# Patient Record
Sex: FEMALE | Race: WHITE | NOT HISPANIC OR LATINO | URBAN - METROPOLITAN AREA
[De-identification: names, ages, dates, MRNs, and addresses within clinical notes are randomized per-mention and may not be internally consistent; named-entity substitution may affect disease eponyms.]

---

## 2017-11-27 ENCOUNTER — OUTPATIENT (OUTPATIENT)
Dept: OUTPATIENT SERVICES | Facility: HOSPITAL | Age: 52
LOS: 1 days | Discharge: HOME | End: 2017-11-27

## 2017-11-27 DIAGNOSIS — C50.912 MALIGNANT NEOPLASM OF UNSPECIFIED SITE OF LEFT FEMALE BREAST: ICD-10-CM

## 2017-12-01 ENCOUNTER — RESULT REVIEW (OUTPATIENT)
Age: 52
End: 2017-12-01

## 2017-12-01 ENCOUNTER — APPOINTMENT (OUTPATIENT)
Dept: HEMATOLOGY ONCOLOGY | Facility: CLINIC | Age: 52
End: 2017-12-01
Payer: COMMERCIAL

## 2017-12-01 VITALS
SYSTOLIC BLOOD PRESSURE: 120 MMHG | RESPIRATION RATE: 16 BRPM | WEIGHT: 166.01 LBS | HEIGHT: 62 IN | DIASTOLIC BLOOD PRESSURE: 78 MMHG | OXYGEN SATURATION: 99 % | TEMPERATURE: 98.8 F | HEART RATE: 75 BPM | BODY MASS INDEX: 30.55 KG/M2

## 2017-12-01 DIAGNOSIS — Z13.71 ENCOUNTER FOR NONPROCREATIVE SCREENING FOR GENETIC DISEASE CARRIER STATUS: ICD-10-CM

## 2017-12-01 LAB
BASOPHILS # BLD AUTO: 0.1 K/UL — SIGNIFICANT CHANGE UP (ref 0–0.2)
BASOPHILS NFR BLD AUTO: 1.4 % — SIGNIFICANT CHANGE UP (ref 0–2)
EOSINOPHIL # BLD AUTO: 0.1 K/UL — SIGNIFICANT CHANGE UP (ref 0–0.5)
EOSINOPHIL NFR BLD AUTO: 1.8 % — SIGNIFICANT CHANGE UP (ref 0–6)
HCT VFR BLD CALC: 39.5 % — SIGNIFICANT CHANGE UP (ref 34.5–45)
HGB BLD-MCNC: 13.2 G/DL — SIGNIFICANT CHANGE UP (ref 11.5–15.5)
LYMPHOCYTES # BLD AUTO: 1.9 K/UL — SIGNIFICANT CHANGE UP (ref 1–3.3)
LYMPHOCYTES # BLD AUTO: 28.2 % — SIGNIFICANT CHANGE UP (ref 13–44)
MCHC RBC-ENTMCNC: 29.8 PG — SIGNIFICANT CHANGE UP (ref 27–34)
MCHC RBC-ENTMCNC: 33.5 G/DL — SIGNIFICANT CHANGE UP (ref 32–36)
MCV RBC AUTO: 89 FL — SIGNIFICANT CHANGE UP (ref 80–100)
MONOCYTES # BLD AUTO: 0.5 K/UL — SIGNIFICANT CHANGE UP (ref 0–0.9)
MONOCYTES NFR BLD AUTO: 7.3 % — SIGNIFICANT CHANGE UP (ref 2–14)
NEUTROPHILS # BLD AUTO: 4.1 K/UL — SIGNIFICANT CHANGE UP (ref 1.8–7.4)
NEUTROPHILS NFR BLD AUTO: 61.3 % — SIGNIFICANT CHANGE UP (ref 43–77)
PLATELET # BLD AUTO: 228 K/UL — SIGNIFICANT CHANGE UP (ref 150–400)
RBC # BLD: 4.44 M/UL — SIGNIFICANT CHANGE UP (ref 3.8–5.2)
RBC # FLD: 11 % — SIGNIFICANT CHANGE UP (ref 10.3–14.5)
WBC # BLD: 6.7 K/UL — SIGNIFICANT CHANGE UP (ref 3.8–10.5)
WBC # FLD AUTO: 6.7 K/UL — SIGNIFICANT CHANGE UP (ref 3.8–10.5)

## 2017-12-01 PROCEDURE — 99215 OFFICE O/P EST HI 40 MIN: CPT

## 2017-12-01 RX ORDER — SODIUM PICOSULFATE, MAGNESIUM OXIDE, AND ANHYDROUS CITRIC ACID 10; 3.5; 12 MG/16.2G; G/16.2G; G/16.2G
10-3.5-12 POWDER, METERED ORAL
Qty: 2 | Refills: 0 | Status: DISCONTINUED | COMMUNITY
Start: 2017-07-25

## 2017-12-05 LAB
25(OH)D3 SERPL-MCNC: 33 NG/ML
ALBUMIN SERPL ELPH-MCNC: 4.5 G/DL
ALP BLD-CCNC: 70 U/L
ALT SERPL-CCNC: 42 U/L
ANION GAP SERPL CALC-SCNC: 12 MMOL/L
AST SERPL-CCNC: 28 U/L
BILIRUB SERPL-MCNC: <0.2 MG/DL
BUN SERPL-MCNC: 18 MG/DL
CALCIUM SERPL-MCNC: 9.6 MG/DL
CHLORIDE SERPL-SCNC: 103 MMOL/L
CO2 SERPL-SCNC: 26 MMOL/L
CREAT SERPL-MCNC: 0.75 MG/DL
GLUCOSE SERPL-MCNC: 98 MG/DL
POTASSIUM SERPL-SCNC: 4.2 MMOL/L
PROT SERPL-MCNC: 7.3 G/DL
SODIUM SERPL-SCNC: 141 MMOL/L

## 2018-12-17 ENCOUNTER — OUTPATIENT (OUTPATIENT)
Dept: OUTPATIENT SERVICES | Facility: HOSPITAL | Age: 53
LOS: 1 days | Discharge: HOME | End: 2018-12-17

## 2018-12-17 DIAGNOSIS — C50.912 MALIGNANT NEOPLASM OF UNSPECIFIED SITE OF LEFT FEMALE BREAST: ICD-10-CM

## 2018-12-28 ENCOUNTER — RESULT REVIEW (OUTPATIENT)
Age: 53
End: 2018-12-28

## 2018-12-28 ENCOUNTER — APPOINTMENT (OUTPATIENT)
Dept: HEMATOLOGY ONCOLOGY | Facility: CLINIC | Age: 53
End: 2018-12-28
Payer: COMMERCIAL

## 2018-12-28 ENCOUNTER — TRANSCRIPTION ENCOUNTER (OUTPATIENT)
Age: 53
End: 2018-12-28

## 2018-12-28 VITALS
HEART RATE: 92 BPM | OXYGEN SATURATION: 98 % | DIASTOLIC BLOOD PRESSURE: 73 MMHG | WEIGHT: 165.1 LBS | SYSTOLIC BLOOD PRESSURE: 106 MMHG | TEMPERATURE: 98.4 F | RESPIRATION RATE: 16 BRPM | BODY MASS INDEX: 30.2 KG/M2

## 2018-12-28 DIAGNOSIS — G89.18 OTHER ACUTE POSTPROCEDURAL PAIN: ICD-10-CM

## 2018-12-28 DIAGNOSIS — M79.18 MYALGIA, OTHER SITE: ICD-10-CM

## 2018-12-28 LAB
BASOPHILS # BLD AUTO: 0.1 K/UL — SIGNIFICANT CHANGE UP (ref 0–0.2)
BASOPHILS NFR BLD AUTO: 1.6 % — SIGNIFICANT CHANGE UP (ref 0–2)
EOSINOPHIL # BLD AUTO: 0.1 K/UL — SIGNIFICANT CHANGE UP (ref 0–0.5)
EOSINOPHIL NFR BLD AUTO: 2.8 % — SIGNIFICANT CHANGE UP (ref 0–6)
HCT VFR BLD CALC: 39.2 % — SIGNIFICANT CHANGE UP (ref 34.5–45)
HGB BLD-MCNC: 13.9 G/DL — SIGNIFICANT CHANGE UP (ref 11.5–15.5)
LYMPHOCYTES # BLD AUTO: 1.3 K/UL — SIGNIFICANT CHANGE UP (ref 1–3.3)
LYMPHOCYTES # BLD AUTO: 32.8 % — SIGNIFICANT CHANGE UP (ref 13–44)
MCHC RBC-ENTMCNC: 31.3 PG — SIGNIFICANT CHANGE UP (ref 27–34)
MCHC RBC-ENTMCNC: 35.5 G/DL — SIGNIFICANT CHANGE UP (ref 32–36)
MCV RBC AUTO: 88 FL — SIGNIFICANT CHANGE UP (ref 80–100)
MONOCYTES # BLD AUTO: 0.4 K/UL — SIGNIFICANT CHANGE UP (ref 0–0.9)
MONOCYTES NFR BLD AUTO: 9.6 % — SIGNIFICANT CHANGE UP (ref 2–14)
NEUTROPHILS # BLD AUTO: 2.2 K/UL — SIGNIFICANT CHANGE UP (ref 1.8–7.4)
NEUTROPHILS NFR BLD AUTO: 53.2 % — SIGNIFICANT CHANGE UP (ref 43–77)
PLATELET # BLD AUTO: 250 K/UL — SIGNIFICANT CHANGE UP (ref 150–400)
RBC # BLD: 4.45 M/UL — SIGNIFICANT CHANGE UP (ref 3.8–5.2)
RBC # FLD: 11.4 % — SIGNIFICANT CHANGE UP (ref 10.3–14.5)
WBC # BLD: 4.1 K/UL — SIGNIFICANT CHANGE UP (ref 3.8–10.5)
WBC # FLD AUTO: 4.1 K/UL — SIGNIFICANT CHANGE UP (ref 3.8–10.5)

## 2018-12-28 PROCEDURE — 99214 OFFICE O/P EST MOD 30 MIN: CPT

## 2018-12-28 RX ORDER — THYROID, PORCINE 60 MG/1
60 TABLET ORAL
Qty: 30 | Refills: 0 | Status: DISCONTINUED | COMMUNITY
Start: 2017-05-17 | End: 2018-12-28

## 2018-12-28 NOTE — ASSESSMENT
[FreeTextEntry1] : stage IIIa ER positive, HER-2/benny positive left breast cancer in 2006, status post neoadjuvant ACTH followed by bilateral mastectomy with implants in May 2007, radiation therapy, BSO in 2008, exemestane from July 2007 to February 2011. Exemestane was stopped in February 2011 due to pulmonary embolism and abnormal LFTs. Anastrozole was started in April 2013, which she took inconsistently for a year and a half and eventually stopped in 2015 due to significant arthralgias.\par \par - Breast ca: She is doing well. Clinically TARSHA. No sequela from chemotherapy or surgery. Encourage healthy diet and exercise. Routine labs today\par - Post mastectomy pain: s/p injections for myofascial syndrome. Pain improved. Rec to f/u with plastics as they are almost 9 yrs old.  \par - Low Vit D: Levels improved with suppls. Continue calcium and vitamin D for bone health\par - Continue followup with primary care for age-appropriate malignancy screening\par - Educated about s/sx of recurrence\par - RTC  1y

## 2018-12-28 NOTE — HISTORY OF PRESENT ILLNESS
[Treatment Protocol] : Treatment Protocol [de-identified] : 53-year-old lady with history of stage IIIa left breast cancer, ER positive and HER-2/benny positive diagnosed in 2006.\par \par She initially presented at age 40 years when she felt a left breast lump and noted to have matted lymph nodes on exam. Core biopsy revealed adenocarcinoma, ER positive, [place] positive and HER-2/benny positive. She received neoadjuvant chemotherapy with AC followed by TH from December 2006 to May 2007. She underwent bilateral mastectomy implants in May 2007. JCB adjuvant left chest wall radiation from July 2007 to August 2007. She received ovarian suppression (SOFT trial) plus exemestane from July 2007. She underwent bilateral salpingo-oophorectomy in 2008 she continued exemestane from July 2007 to February 2011. Patient developed pulmonary embolism in Feb 2011.. She was treated with Lovenox for 6 months. Exemestane was stopped at that time 2 new pulmonary embolism and abnormal LFTs. She was restarted on anastrozole in April 2013 but stopped in Aug 2015 due to intolerable side effects. SHe didn't want to consider prolonged AI use.  [FreeTextEntry1] : ACTH 12/2006 - 5/2007.\par Lupron + Exemestane 7/2007- 7/2008\par Exemestane completed 2/2011. [de-identified] : Ms. FAIZAN MORTON is here for f/u of left breast cancer completed endocrine therapy 2/2011.\par No residual s/e from chemo or surgery. No lymphedema. No neuropathy, no cardiac S/E. No bone pain, ROS negative\par She has post mastectomy pain, s/p injection by DR Downs 2 yrs ago and felt better. Implants last replaced 2009. Dr Balbuena\par Working full time (science aid in a school), energy good, no change in appetite, no wt loss\par LMP 2006 (BSO)\par Thyroid issues controlled with meds

## 2018-12-28 NOTE — PHYSICAL EXAM
[Fully active, able to carry on all pre-disease performance without restriction] : Status 0 - Fully active, able to carry on all pre-disease performance without restriction [Normal] : affect appropriate [de-identified] : bilateral mastectomy with implants, no chest wall or axillary nodules

## 2018-12-31 LAB
25(OH)D3 SERPL-MCNC: 35.1 NG/ML
ALBUMIN SERPL ELPH-MCNC: 4.4 G/DL
ALP BLD-CCNC: 59 U/L
ALT SERPL-CCNC: 39 U/L
ANION GAP SERPL CALC-SCNC: 11 MMOL/L
AST SERPL-CCNC: 30 U/L
BILIRUB SERPL-MCNC: 0.2 MG/DL
BUN SERPL-MCNC: 15 MG/DL
CALCIUM SERPL-MCNC: 9.9 MG/DL
CHLORIDE SERPL-SCNC: 103 MMOL/L
CO2 SERPL-SCNC: 26 MMOL/L
CREAT SERPL-MCNC: 0.7 MG/DL
GLUCOSE SERPL-MCNC: 103 MG/DL
POTASSIUM SERPL-SCNC: 4.5 MMOL/L
PROT SERPL-MCNC: 6.9 G/DL
SODIUM SERPL-SCNC: 140 MMOL/L

## 2019-04-01 ENCOUNTER — TRANSCRIPTION ENCOUNTER (OUTPATIENT)
Age: 54
End: 2019-04-01

## 2019-04-23 ENCOUNTER — RECORD ABSTRACTING (OUTPATIENT)
Age: 54
End: 2019-04-23

## 2019-04-23 DIAGNOSIS — I26.99 OTHER PULMONARY EMBOLISM W/OUT ACUTE COR PULMONALE: ICD-10-CM

## 2019-04-23 DIAGNOSIS — R10.32 LEFT LOWER QUADRANT PAIN: ICD-10-CM

## 2019-04-23 DIAGNOSIS — H93.8X9 OTHER SPECIFIED DISORDERS OF EAR, UNSPECIFIED EAR: ICD-10-CM

## 2019-04-23 DIAGNOSIS — R42 DIZZINESS AND GIDDINESS: ICD-10-CM

## 2019-04-23 DIAGNOSIS — Z87.09 PERSONAL HISTORY OF OTHER DISEASES OF THE RESPIRATORY SYSTEM: ICD-10-CM

## 2019-04-23 DIAGNOSIS — K21.9 GASTRO-ESOPHAGEAL REFLUX DISEASE W/OUT ESOPHAGITIS: ICD-10-CM

## 2019-04-23 DIAGNOSIS — Z12.11 ENCOUNTER FOR SCREENING FOR MALIGNANT NEOPLASM OF COLON: ICD-10-CM

## 2019-04-24 ENCOUNTER — APPOINTMENT (OUTPATIENT)
Dept: INTERNAL MEDICINE | Facility: CLINIC | Age: 54
End: 2019-04-24

## 2019-08-05 PROBLEM — R10.32 LEFT LOWER QUADRANT PAIN: Status: ACTIVE | Noted: 2019-04-23

## 2019-08-06 ENCOUNTER — NON-APPOINTMENT (OUTPATIENT)
Age: 54
End: 2019-08-06

## 2019-08-06 ENCOUNTER — APPOINTMENT (OUTPATIENT)
Dept: INTERNAL MEDICINE | Facility: CLINIC | Age: 54
End: 2019-08-06
Payer: COMMERCIAL

## 2019-08-06 VITALS
HEIGHT: 62 IN | WEIGHT: 163 LBS | HEART RATE: 82 BPM | SYSTOLIC BLOOD PRESSURE: 111 MMHG | DIASTOLIC BLOOD PRESSURE: 77 MMHG | BODY MASS INDEX: 30 KG/M2

## 2019-08-06 LAB
BASOPHILS # BLD AUTO: 0.03 K/UL
BASOPHILS NFR BLD AUTO: 0.8 %
BILIRUB UR QL STRIP: NORMAL
CLARITY UR: CLEAR
COLLECTION METHOD: NORMAL
EOSINOPHIL # BLD AUTO: 0.13 K/UL
EOSINOPHIL NFR BLD AUTO: 3.3 %
ESTIMATED AVERAGE GLUCOSE: 117 MG/DL
GLUCOSE UR-MCNC: NORMAL
HBA1C MFR BLD HPLC: 5.7 %
HCG UR QL: 0.2 EU/DL
HCT VFR BLD CALC: 41.3 %
HGB BLD-MCNC: 13.5 G/DL
HGB UR QL STRIP.AUTO: NORMAL
IMM GRANULOCYTES NFR BLD AUTO: 0.3 %
KETONES UR-MCNC: NORMAL
LEUKOCYTE ESTERASE UR QL STRIP: NORMAL
LYMPHOCYTES # BLD AUTO: 1.39 K/UL
LYMPHOCYTES NFR BLD AUTO: 34.8 %
MAN DIFF?: NORMAL
MCHC RBC-ENTMCNC: 29.4 PG
MCHC RBC-ENTMCNC: 32.7 GM/DL
MCV RBC AUTO: 90 FL
MONOCYTES # BLD AUTO: 0.4 K/UL
MONOCYTES NFR BLD AUTO: 10 %
NEUTROPHILS # BLD AUTO: 2.03 K/UL
NEUTROPHILS NFR BLD AUTO: 50.8 %
NITRITE UR QL STRIP: NORMAL
PH UR STRIP: 6
PLATELET # BLD AUTO: 255 K/UL
PROT UR STRIP-MCNC: NORMAL
RBC # BLD: 4.59 M/UL
RBC # FLD: 11.9 %
SP GR UR STRIP: 1.02
WBC # FLD AUTO: 3.99 K/UL

## 2019-08-06 PROCEDURE — 36415 COLL VENOUS BLD VENIPUNCTURE: CPT

## 2019-08-06 PROCEDURE — 99396 PREV VISIT EST AGE 40-64: CPT | Mod: 25

## 2019-08-06 PROCEDURE — 93000 ELECTROCARDIOGRAM COMPLETE: CPT

## 2019-08-06 PROCEDURE — 81003 URINALYSIS AUTO W/O SCOPE: CPT | Mod: QW

## 2019-08-06 PROCEDURE — G0444 DEPRESSION SCREEN ANNUAL: CPT

## 2019-08-06 RX ORDER — SODIUM PICOSULFATE, MAGNESIUM OXIDE, AND ANHYDROUS CITRIC ACID 10; 3.5; 12 MG/16.2G; G/16.2G; G/16.2G
10-3.5-12 POWDER, METERED ORAL
Refills: 0 | Status: DISCONTINUED | COMMUNITY
End: 2019-08-06

## 2019-08-06 RX ORDER — CALCIUM CARBONATE/VITAMIN D3 600MG-5MCG
600-200 TABLET ORAL
Refills: 0 | Status: DISCONTINUED | COMMUNITY
End: 2019-08-06

## 2019-08-06 RX ORDER — LEVOTHYROXINE SODIUM 0.05 MG/1
50 TABLET ORAL DAILY
Qty: 90 | Refills: 3 | Status: DISCONTINUED | COMMUNITY
End: 2019-08-06

## 2019-08-06 NOTE — HISTORY OF PRESENT ILLNESS
[FreeTextEntry1] : on thyroid rx  has some fatigue  she exercises and she says she is watching carbs

## 2019-08-06 NOTE — HEALTH RISK ASSESSMENT
[Good] : ~his/her~  mood as  good [No] : No [No falls in past year] : Patient reported no falls in the past year [0] : 2) Feeling down, depressed, or hopeless: Not at all (0) [Patient reported PAP Smear was normal] : Patient reported PAP Smear was normal [Patient reported colonoscopy was normal] : Patient reported colonoscopy was normal [None] : None [Fully functional (using the telephone, shopping, preparing meals, housekeeping, doing laundry, using] : Fully functional and needs no help or supervision to perform IADLs (using the telephone, shopping, preparing meals, housekeeping, doing laundry, using transportation, managing medications and managing finances) [Fully functional (bathing, dressing, toileting, transferring, walking, feeding)] : Fully functional (bathing, dressing, toileting, transferring, walking, feeding) [] : No [de-identified] : exercises [de-identified] : good [Reports changes in hearing] : Reports no changes in hearing [Reports changes in vision] : Reports no changes in vision [MammogramDate] : mastectomies [Reports changes in dental health] : Reports no changes in dental health [ColonoscopyDate] : 2017 [PapSmearDate] : 2019

## 2019-08-07 LAB
ALBUMIN SERPL ELPH-MCNC: 4.5 G/DL
ALP BLD-CCNC: 56 U/L
ALT SERPL-CCNC: 32 U/L
ANION GAP SERPL CALC-SCNC: 13 MMOL/L
AST SERPL-CCNC: 25 U/L
BILIRUB SERPL-MCNC: 0.4 MG/DL
BUN SERPL-MCNC: 14 MG/DL
CALCIUM SERPL-MCNC: 9.8 MG/DL
CHLORIDE SERPL-SCNC: 103 MMOL/L
CHOLEST SERPL-MCNC: 183 MG/DL
CHOLEST/HDLC SERPL: 3.5 RATIO
CO2 SERPL-SCNC: 23 MMOL/L
CREAT SERPL-MCNC: 0.78 MG/DL
GLUCOSE SERPL-MCNC: 100 MG/DL
HDLC SERPL-MCNC: 53 MG/DL
LDLC SERPL CALC-MCNC: 113 MG/DL
POTASSIUM SERPL-SCNC: 4.4 MMOL/L
PROT SERPL-MCNC: 6.8 G/DL
SODIUM SERPL-SCNC: 139 MMOL/L
TRIGL SERPL-MCNC: 85 MG/DL
TSH SERPL-ACNC: 0.31 UIU/ML

## 2019-08-12 ENCOUNTER — APPOINTMENT (OUTPATIENT)
Dept: ENDOCRINOLOGY | Facility: CLINIC | Age: 54
End: 2019-08-12
Payer: COMMERCIAL

## 2019-08-12 VITALS
SYSTOLIC BLOOD PRESSURE: 110 MMHG | BODY MASS INDEX: 30.36 KG/M2 | RESPIRATION RATE: 16 BRPM | DIASTOLIC BLOOD PRESSURE: 70 MMHG | WEIGHT: 165 LBS | HEART RATE: 79 BPM | OXYGEN SATURATION: 97 % | HEIGHT: 62 IN

## 2019-08-12 PROCEDURE — 99204 OFFICE O/P NEW MOD 45 MIN: CPT

## 2019-08-12 RX ORDER — OMEGA-3/DHA/EPA/FISH OIL 1200 MG
1200 CAPSULE ORAL
Refills: 0 | Status: DISCONTINUED | COMMUNITY
End: 2019-08-12

## 2019-08-12 NOTE — CONSULT LETTER
[Dear  ___] : Dear  [unfilled], [Sincerely,] : Sincerely, [FreeTextEntry1] : Thank you for referring  Ms. FAIZAN MORTON to me for evaluation and treatment. Please, see attached consultation note. As always, if there are specific questions you would like to discuss, please feel free to contact me.\par Thank you for the courtesy of this evaluation.\par  [FreeTextEntry3] : Abigail cOonnor MD, FACE, ECNU\par

## 2019-08-12 NOTE — ASSESSMENT
[Levothyroxine] : The patient was instructed to take Levothyroxine on an empty stomach, separate from vitamins, and wait at least 30 minutes before eating [FreeTextEntry1] : - discussed with the patient current approaches to Hashimoto's thyroiditis\par - options to lower autoimmunity reviewed\par - advised on R+B of a combination T3/T4 therapy\par - diets reviewed\par - switch to Tirosint 175 mcg qd\par - Proper intake of levothyroxine is reviewed in details, including on an empty stomach, with water only, at least one hour before taking any medications, vitamins, or supplements and three-four hours before taking iron or calcium.\par RTC 2 months, labs prior\par

## 2019-08-12 NOTE — HISTORY OF PRESENT ILLNESS
[FreeTextEntry1] : 53 year female  referred for hypothyroidism management. \par Ms. MORTON  was diagnosed with Hashimoto's hypothyroidism in 2015. Previously under care of Dr. Menjivar.\par Initially on synthroid 112mcg, eventually switched to [place] thyroid because she was not feeling well. She's presently on [place] 120 mg, but states that she feels the same. Still complains of fatigue, difficulties losing weight, hair thinning\par Denies family history of thyroid cancer or history of radiation exposure to head and neck area in a childhood.\par TSH- 0.31 <-- 4.92 <-- 2.71, \par FT4 DD- 0.65\par prior T4- 4.8, T3- 113\par She recalls a normal thyroid US done several years ago\par Had DXA last week- results are still pending

## 2019-08-13 LAB — T4 FREE SERPL DIALY-MCNC: 0.65 NG/DL

## 2019-09-09 ENCOUNTER — APPOINTMENT (OUTPATIENT)
Dept: PLASTIC SURGERY | Facility: CLINIC | Age: 54
End: 2019-09-09
Payer: COMMERCIAL

## 2019-09-09 VITALS — HEIGHT: 62 IN | BODY MASS INDEX: 30.36 KG/M2 | WEIGHT: 165 LBS

## 2019-09-09 PROCEDURE — 99205 OFFICE O/P NEW HI 60 MIN: CPT

## 2019-09-09 NOTE — REASON FOR VISIT
[Consultation] : a consultation visit [Spouse] : spouse [FreeTextEntry1] : patient presents for breast reconstruction consultation

## 2019-09-09 NOTE — HISTORY OF PRESENT ILLNESS
[FreeTextEntry1] : 52 yo female presents to discuss breast reconstruction revision.  The patient was diagnosed with left breast cancer in .  She underwent bilateral skin sparing mastectomies with Dr. Oropeza and implant reconstruction with saline implants.  The implants were placed subpectoral, patient is unsure of size or type.  The patient then underwent chemotherapy and radiation.  In  the implants were replaced with silicone implants.  The patient had bilateral NAC reconstruction in .  In  the patient had the left implant exchanged following capsular contracture.  She complains of animation deformity and ptosis of the left breast implant. The patient has discomfort at both breast reconstruction sites, left >> right.  This is exacerbated with activity. \par \par The patient has hypothyroidism for which she takes Tirosint.  She has history of PE in  while taking exemestane.  She has additional surgical history of laparoscopic oophorectomy and laparoscopic cholecystectomy.  She has two children, both .  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

## 2019-09-09 NOTE — PHYSICAL EXAM
[NI] : Normal [de-identified] : evidence of bilateral nipple and areola reconstruction, sternal notch to reconstructed nipple on the left is 19 cm and 19 cm on the right, the nipple to IMF is 10 cm on the left and 8 cm on the right, the base width is 11 cm, there is grade 1 ptosis.  The right IMF is higher than the left IMF.  There are bilateral T pattern scars and right inferior pole scar contracture (evidence of past mastectomy skin necrosis). There is animation deformity and significant breast asymmetry.  There is inferior left breast reconstruction implant malposition (with recruitment of abdominal skin).  there is no open wound or palpable mass of either breast reconstruction site.  [de-identified] : there is adequate adiposity for free flap tissue transfer, but with smaller volume than current breast volume

## 2019-10-09 ENCOUNTER — LABORATORY RESULT (OUTPATIENT)
Age: 54
End: 2019-10-09

## 2019-10-11 ENCOUNTER — TRANSCRIPTION ENCOUNTER (OUTPATIENT)
Age: 54
End: 2019-10-11

## 2019-10-15 ENCOUNTER — APPOINTMENT (OUTPATIENT)
Dept: ENDOCRINOLOGY | Facility: CLINIC | Age: 54
End: 2019-10-15
Payer: COMMERCIAL

## 2019-10-15 VITALS
TEMPERATURE: 98.2 F | OXYGEN SATURATION: 96 % | HEIGHT: 62 IN | DIASTOLIC BLOOD PRESSURE: 76 MMHG | WEIGHT: 165 LBS | SYSTOLIC BLOOD PRESSURE: 110 MMHG | BODY MASS INDEX: 30.36 KG/M2 | HEART RATE: 71 BPM

## 2019-10-15 PROCEDURE — 99214 OFFICE O/P EST MOD 30 MIN: CPT

## 2019-10-15 NOTE — HISTORY OF PRESENT ILLNESS
[FreeTextEntry1] : 53 year female  f/u  for hypothyroidism management. \par feels well on Tirosint overall, but with occas palpitations past month\par still on 175 mcg qd\par Hair loss improved a lot.\par TSH- 0.01, FT4- 1.9\par neg celiac panel\par \par HPI:\par Ms. MORTON  was diagnosed with Hashimoto's hypothyroidism in 2015. Previously under care of Dr. Menjivar.\par Initially on synthroid 112mcg, eventually switched to [place] thyroid because she was not feeling well. She's presently on [place] 120 mg, but states that she feels the same. Still complains of fatigue, difficulties losing weight, hair thinning\par Denies family history of thyroid cancer or history of radiation exposure to head and neck area in a childhood.\par TSH- 0.31 <-- 4.92 <-- 2.71, \par FT4 DD- 0.65\par prior T4- 4.8, T3- 113\par She recalls a normal thyroid US done several years ago\par Had DXA last week- results are still pending

## 2019-10-15 NOTE — ASSESSMENT
[Levothyroxine] : The patient was instructed to take Levothyroxine on an empty stomach, separate from vitamins, and wait at least 30 minutes before eating [FreeTextEntry1] : - discussed with the patient current approaches to Hashimoto's thyroiditis\par - options to lower autoimmunity reviewed\par - advised on R+B of a combination T3/T4 therapy\par - diets reviewed\par - decr Tirosint 150 mcg qd\par - Proper intake of levothyroxine is reviewed in details, including on an empty stomach, with water only, at least one hour before taking any medications, vitamins, or supplements and three-four hours before taking iron or calcium.\par RTC 2 months, labs prior\par

## 2019-11-07 ENCOUNTER — LABORATORY RESULT (OUTPATIENT)
Age: 54
End: 2019-11-07

## 2019-12-16 ENCOUNTER — APPOINTMENT (OUTPATIENT)
Dept: ENDOCRINOLOGY | Facility: CLINIC | Age: 54
End: 2019-12-16
Payer: COMMERCIAL

## 2019-12-16 VITALS
SYSTOLIC BLOOD PRESSURE: 115 MMHG | HEIGHT: 62 IN | WEIGHT: 165 LBS | BODY MASS INDEX: 30.36 KG/M2 | RESPIRATION RATE: 16 BRPM | OXYGEN SATURATION: 98 % | DIASTOLIC BLOOD PRESSURE: 62 MMHG | HEART RATE: 76 BPM

## 2019-12-16 PROCEDURE — 99213 OFFICE O/P EST LOW 20 MIN: CPT | Mod: 25

## 2019-12-16 PROCEDURE — 36415 COLL VENOUS BLD VENIPUNCTURE: CPT

## 2019-12-16 RX ORDER — THYROID, PORCINE 90 MG/1
90 TABLET ORAL
Refills: 0 | Status: DISCONTINUED | COMMUNITY
Start: 2017-05-17 | End: 2019-12-16

## 2019-12-16 NOTE — ASSESSMENT
[Levothyroxine] : The patient was instructed to take Levothyroxine on an empty stomach, separate from vitamins, and wait at least 30 minutes before eating [FreeTextEntry1] : - discussed with the patient current approaches to Hashimoto's thyroiditis\par - options to lower autoimmunity reviewed\par - advised on R+B of a combination T3/T4 therapy\par - diets reviewed\par - cont Tirosint 137 mcg qd and check labs today\par - Proper intake of levothyroxine is reviewed in details, including on an empty stomach, with water only, at least one hour before taking any medications, vitamins, or supplements and three-four hours before taking iron or calcium.\par RTC 3 months, labs prior\par

## 2019-12-16 NOTE — HISTORY OF PRESENT ILLNESS
[FreeTextEntry1] : 54 year female  f/u  for hypothyroidism management. \par \par *** Dec 16, 2019 ***\par \par feels better on a lower dose  tirosint 137 mcg. no more jittery feeling\par no recent labs\par prior TSH- 15.7 (off meds x 3 weeks)\par \par *** Oct 15, 2019 ***\par \par feels well on Tirosint overall, but with occas palpitations past month\par still on 175 mcg qd\par Hair loss improved a lot.\par TSH- 0.01, FT4- 1.9\par neg celiac panel\par \par HPI:\par Ms. MORTON  was diagnosed with Hashimoto's hypothyroidism in 2015. Previously under care of Dr. Menjivar.\par Initially on synthroid 112mcg, eventually switched to [place] thyroid because she was not feeling well. She's presently on [place] 120 mg, but states that she feels the same. Still complains of fatigue, difficulties losing weight, hair thinning\par Denies family history of thyroid cancer or history of radiation exposure to head and neck area in a childhood.\par TSH- 0.31 <-- 4.92 <-- 2.71, \par FT4 DD- 0.65\par prior T4- 4.8, T3- 113\par She recalls a normal thyroid US done several years ago\par Had DXA last week- results are still pending

## 2019-12-17 ENCOUNTER — TRANSCRIPTION ENCOUNTER (OUTPATIENT)
Age: 54
End: 2019-12-17

## 2019-12-17 LAB
T4 FREE SERPL-MCNC: 1.6 NG/DL
THYROGLOB AB SERPL-ACNC: <20 IU/ML
THYROPEROXIDASE AB SERPL IA-ACNC: 24.8 IU/ML
TSH SERPL-ACNC: 0.38 UIU/ML

## 2019-12-20 ENCOUNTER — TRANSCRIPTION ENCOUNTER (OUTPATIENT)
Age: 54
End: 2019-12-20

## 2019-12-20 ENCOUNTER — RX RENEWAL (OUTPATIENT)
Age: 54
End: 2019-12-20

## 2020-08-11 ENCOUNTER — TRANSCRIPTION ENCOUNTER (OUTPATIENT)
Age: 55
End: 2020-08-11

## 2020-08-24 ENCOUNTER — OUTPATIENT (OUTPATIENT)
Dept: OUTPATIENT SERVICES | Facility: HOSPITAL | Age: 55
LOS: 1 days | Discharge: HOME | End: 2020-08-24

## 2020-08-24 DIAGNOSIS — C50.912 MALIGNANT NEOPLASM OF UNSPECIFIED SITE OF LEFT FEMALE BREAST: ICD-10-CM

## 2020-08-31 ENCOUNTER — APPOINTMENT (OUTPATIENT)
Dept: HEMATOLOGY ONCOLOGY | Facility: CLINIC | Age: 55
End: 2020-08-31
Payer: COMMERCIAL

## 2020-08-31 ENCOUNTER — RESULT REVIEW (OUTPATIENT)
Age: 55
End: 2020-08-31

## 2020-08-31 VITALS
WEIGHT: 169.76 LBS | BODY MASS INDEX: 30.84 KG/M2 | TEMPERATURE: 98.6 F | SYSTOLIC BLOOD PRESSURE: 108 MMHG | DIASTOLIC BLOOD PRESSURE: 76 MMHG | RESPIRATION RATE: 16 BRPM | HEIGHT: 62.2 IN | HEART RATE: 89 BPM | OXYGEN SATURATION: 99 %

## 2020-08-31 LAB
BASOPHILS # BLD AUTO: 0.04 K/UL — SIGNIFICANT CHANGE UP (ref 0–0.2)
BASOPHILS NFR BLD AUTO: 0.8 % — SIGNIFICANT CHANGE UP (ref 0–2)
EOSINOPHIL # BLD AUTO: 0.11 K/UL — SIGNIFICANT CHANGE UP (ref 0–0.5)
EOSINOPHIL NFR BLD AUTO: 2.1 % — SIGNIFICANT CHANGE UP (ref 0–6)
HCT VFR BLD CALC: 41.9 % — SIGNIFICANT CHANGE UP (ref 34.5–45)
HGB BLD-MCNC: 13.7 G/DL — SIGNIFICANT CHANGE UP (ref 11.5–15.5)
IMM GRANULOCYTES NFR BLD AUTO: 0.6 % — SIGNIFICANT CHANGE UP (ref 0–1.5)
LYMPHOCYTES # BLD AUTO: 1.61 K/UL — SIGNIFICANT CHANGE UP (ref 1–3.3)
LYMPHOCYTES # BLD AUTO: 31.1 % — SIGNIFICANT CHANGE UP (ref 13–44)
MCHC RBC-ENTMCNC: 29.5 PG — SIGNIFICANT CHANGE UP (ref 27–34)
MCHC RBC-ENTMCNC: 32.7 GM/DL — SIGNIFICANT CHANGE UP (ref 32–36)
MCV RBC AUTO: 90.3 FL — SIGNIFICANT CHANGE UP (ref 80–100)
MONOCYTES # BLD AUTO: 0.43 K/UL — SIGNIFICANT CHANGE UP (ref 0–0.9)
MONOCYTES NFR BLD AUTO: 8.3 % — SIGNIFICANT CHANGE UP (ref 2–14)
NEUTROPHILS # BLD AUTO: 2.96 K/UL — SIGNIFICANT CHANGE UP (ref 1.8–7.4)
NEUTROPHILS NFR BLD AUTO: 57.1 % — SIGNIFICANT CHANGE UP (ref 43–77)
NRBC # BLD: 0 /100 WBCS — SIGNIFICANT CHANGE UP (ref 0–0)
PLATELET # BLD AUTO: 240 K/UL — SIGNIFICANT CHANGE UP (ref 150–400)
RBC # BLD: 4.64 M/UL — SIGNIFICANT CHANGE UP (ref 3.8–5.2)
RBC # FLD: 11.9 % — SIGNIFICANT CHANGE UP (ref 10.3–14.5)
WBC # BLD: 5.18 K/UL — SIGNIFICANT CHANGE UP (ref 3.8–10.5)
WBC # FLD AUTO: 5.18 K/UL — SIGNIFICANT CHANGE UP (ref 3.8–10.5)

## 2020-08-31 PROCEDURE — 99214 OFFICE O/P EST MOD 30 MIN: CPT

## 2020-08-31 NOTE — HISTORY OF PRESENT ILLNESS
[Treatment Protocol] : Treatment Protocol [de-identified] : 53-year-old lady with history of stage IIIa left breast cancer, ER positive and HER-2/benny positive diagnosed in 2006.\par \par She initially presented at age 40 years when she felt a left breast lump and noted to have matted lymph nodes on exam. Core biopsy revealed adenocarcinoma, ER positive, [place] positive and HER-2/benny positive. She received neoadjuvant chemotherapy with AC followed by TH from December 2006 to May 2007. She underwent bilateral mastectomy implants in May 2007. JCB adjuvant left chest wall radiation from July 2007 to August 2007. She received ovarian suppression (SOFT trial) plus exemestane from July 2007. She underwent bilateral salpingo-oophorectomy in 2008 she continued exemestane from July 2007 to February 2011. Patient developed pulmonary embolism in Feb 2011.. She was treated with Lovenox for 6 months. Exemestane was stopped at that time 2 new pulmonary embolism and abnormal LFTs. She was restarted on anastrozole in April 2013 but stopped in Aug 2015 due to intolerable side effects. SHe didn't want to consider prolonged AI use.  [FreeTextEntry1] : ACTH 12/2006 - 5/2007.\par Lupron + Exemestane 7/2007- 7/2008\par Exemestane completed 2/2011. [de-identified] : Ms. FAIZAN MORTON is here for f/u of left breast cancer completed endocrine therapy 2/2011. \par No residual s/e from chemo or surgery. No lymphedema. No neuropathy, no cardiac S/E. No bone pain, ROS negative\par She has post mastectomy pain, s/p injection by DR Downs 2 yrs ago and felt better. Implants last replaced 2009. Dr Balbuena. Saw Dr Wright and was suggested LEA but pt is nervous. She is thinking. 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

## 2020-08-31 NOTE — PHYSICAL EXAM
[Fully active, able to carry on all pre-disease performance without restriction] : Status 0 - Fully active, able to carry on all pre-disease performance without restriction [Normal] : affect appropriate [de-identified] : bilateral mastectomy with implants, no chest wall or axillary nodules

## 2020-08-31 NOTE — ASSESSMENT
[FreeTextEntry1] : stage IIIa ER positive, HER-2/benny positive left breast cancer in 2006, status post neoadjuvant ACTH followed by bilateral mastectomy with implants in May 2007, radiation therapy, BSO in 2008, exemestane from July 2007 to February 2011. Exemestane was stopped in February 2011 due to pulmonary embolism and abnormal LFTs. Anastrozole was started in April 2013, which she took inconsistently for a year and a half and eventually stopped in 2015 due to significant arthralgias.\par \par - Breast ca: She is doing well. Clinically TARSHA. No sequela from chemotherapy or surgery. Encourage healthy diet and exercise. Routine labs today\par - Post mastectomy pain: s/p injections for myofascial syndrome. Pain improved. Rec to f/u with plastics \par - Low Vit D: Levels improved with suppls. Continue calcium and vitamin D for bone health\par - Continue followup with primary care for age-appropriate malignancy screening\par - Educated about s/sx of recurrence\par - RTC  1y

## 2020-09-01 ENCOUNTER — TRANSCRIPTION ENCOUNTER (OUTPATIENT)
Age: 55
End: 2020-09-01

## 2020-09-01 LAB
25(OH)D3 SERPL-MCNC: 35.7 NG/ML
ALBUMIN SERPL ELPH-MCNC: 4.7 G/DL
ALP BLD-CCNC: 62 U/L
ALT SERPL-CCNC: 30 U/L
ANION GAP SERPL CALC-SCNC: 10 MMOL/L
AST SERPL-CCNC: 22 U/L
BILIRUB SERPL-MCNC: 0.3 MG/DL
BUN SERPL-MCNC: 17 MG/DL
CALCIUM SERPL-MCNC: 10 MG/DL
CHLORIDE SERPL-SCNC: 102 MMOL/L
CO2 SERPL-SCNC: 27 MMOL/L
CREAT SERPL-MCNC: 0.83 MG/DL
GLUCOSE SERPL-MCNC: 98 MG/DL
POTASSIUM SERPL-SCNC: 4.6 MMOL/L
PROT SERPL-MCNC: 7.1 G/DL
SARS-COV-2 IGG SERPL IA-ACNC: <0.1 INDEX
SARS-COV-2 IGG SERPL QL IA: NEGATIVE
SODIUM SERPL-SCNC: 139 MMOL/L

## 2020-09-02 ENCOUNTER — NON-APPOINTMENT (OUTPATIENT)
Age: 55
End: 2020-09-02

## 2020-09-02 ENCOUNTER — APPOINTMENT (OUTPATIENT)
Dept: INTERNAL MEDICINE | Facility: CLINIC | Age: 55
End: 2020-09-02
Payer: COMMERCIAL

## 2020-09-02 VITALS
OXYGEN SATURATION: 99 % | DIASTOLIC BLOOD PRESSURE: 79 MMHG | HEART RATE: 78 BPM | HEIGHT: 62 IN | SYSTOLIC BLOOD PRESSURE: 122 MMHG | WEIGHT: 169 LBS | BODY MASS INDEX: 31.1 KG/M2

## 2020-09-02 LAB
BILIRUB UR QL STRIP: NORMAL
CLARITY UR: CLEAR
COLLECTION METHOD: NORMAL
GLUCOSE UR-MCNC: NORMAL
HCG UR QL: 0.2 EU/DL
HGB UR QL STRIP.AUTO: NORMAL
KETONES UR-MCNC: NORMAL
LEUKOCYTE ESTERASE UR QL STRIP: NORMAL
NITRITE UR QL STRIP: NORMAL
PH UR STRIP: 6
PROT UR STRIP-MCNC: NORMAL
SP GR UR STRIP: 1.02

## 2020-09-02 PROCEDURE — G0444 DEPRESSION SCREEN ANNUAL: CPT | Mod: NC

## 2020-09-02 PROCEDURE — 93000 ELECTROCARDIOGRAM COMPLETE: CPT

## 2020-09-02 PROCEDURE — 99396 PREV VISIT EST AGE 40-64: CPT | Mod: 25

## 2020-09-02 PROCEDURE — 36415 COLL VENOUS BLD VENIPUNCTURE: CPT

## 2020-09-02 PROCEDURE — 81003 URINALYSIS AUTO W/O SCOPE: CPT | Mod: QW

## 2020-09-02 NOTE — HEALTH RISK ASSESSMENT
[Patient reported colonoscopy was normal] : Patient reported colonoscopy was normal [Good] : ~his/her~  mood as  good [0] : 1) Little interest or pleasure doing things: Not at all (0) [No Retinopathy] : No retinopathy [] : No [Fully functional (bathing, dressing, toileting, transferring, walking, feeding)] : Fully functional (bathing, dressing, toileting, transferring, walking, feeding) [Fully functional (using the telephone, shopping, preparing meals, housekeeping, doing laundry, using] : Fully functional and needs no help or supervision to perform IADLs (using the telephone, shopping, preparing meals, housekeeping, doing laundry, using transportation, managing medications and managing finances) [Reports changes in dental health] : Reports no changes in dental health [Smoke Detector] : no smoke detector [MammogramComments] : mastectomies [PapSmearDate] : 2020 [ColonoscopyDate] : 2017

## 2020-09-09 ENCOUNTER — RX RENEWAL (OUTPATIENT)
Age: 55
End: 2020-09-09

## 2020-10-06 ENCOUNTER — APPOINTMENT (OUTPATIENT)
Dept: ENDOCRINOLOGY | Facility: CLINIC | Age: 55
End: 2020-10-06
Payer: COMMERCIAL

## 2020-10-06 ENCOUNTER — LABORATORY RESULT (OUTPATIENT)
Age: 55
End: 2020-10-06

## 2020-10-06 ENCOUNTER — APPOINTMENT (OUTPATIENT)
Dept: ENDOCRINOLOGY | Facility: CLINIC | Age: 55
End: 2020-10-06

## 2020-10-06 VITALS
WEIGHT: 169 LBS | OXYGEN SATURATION: 98 % | RESPIRATION RATE: 16 BRPM | TEMPERATURE: 98.6 F | DIASTOLIC BLOOD PRESSURE: 70 MMHG | BODY MASS INDEX: 31.1 KG/M2 | HEIGHT: 62 IN | HEART RATE: 78 BPM | SYSTOLIC BLOOD PRESSURE: 117 MMHG
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
## 2020-10-06 PROCEDURE — 36415 COLL VENOUS BLD VENIPUNCTURE: CPT

## 2020-10-06 PROCEDURE — 99214 OFFICE O/P EST MOD 30 MIN: CPT | Mod: 25

## 2020-10-06 NOTE — HISTORY OF PRESENT ILLNESS
[FreeTextEntry1] : 54 year female  f/u  for hypothyroidism management. \par \par *** Oct 06, 2020 ***\par \par on tirosint 137 mcg. feels well. concerned with having multiple tick bites about a month ago. No rash or any other symptoms\par no recent labs\par \par *** Dec 16, 2019 ***\par \par feels better on a lower dose  tirosint 137 mcg. no more jittery feeling\par no recent labs\par prior TSH- 15.7 (off meds x 3 weeks)\par \par *** Oct 15, 2019 ***\par \par feels well on Tirosint overall, but with occas palpitations past month\par still on 175 mcg qd\par Hair loss improved a lot.\par TSH- 0.01, FT4- 1.9\par neg celiac panel\par \par HPI:\par Ms. MORTON  was diagnosed with Hashimoto's hypothyroidism in 2015. Previously under care of Dr. Menjivar.\par Initially on synthroid 112mcg, eventually switched to [place] thyroid because she was not feeling well. She's presently on [place] 120 mg, but states that she feels the same. Still complains of fatigue, difficulties losing weight, hair thinning\par Denies family history of thyroid cancer or history of radiation exposure to head and neck area in a childhood.\par TSH- 0.31 <-- 4.92 <-- 2.71, \par FT4 DD- 0.65\par prior T4- 4.8, T3- 113\par She recalls a normal thyroid US done several years ago\par Had DXA last week- results are still pending

## 2020-10-06 NOTE — ASSESSMENT
[Levothyroxine] : The patient was instructed to take Levothyroxine on an empty stomach, separate from vitamins, and wait at least 30 minutes before eating [FreeTextEntry1] : - discussed with the patient current approaches to Hashimoto's thyroiditis\par - options to lower autoimmunity reviewed\par - advised on R+B of a combination T3/T4 therapy\par - diets reviewed\par - cont Tirosint 137 mcg qd and check labs today\par - Proper intake of levothyroxine is reviewed in details, including on an empty stomach, with water only, at least one hour before taking any medications, vitamins, or supplements and three-four hours before taking iron or calcium.\par - screen for Lyme. Advised on typical course and need  to f/u with PCP if indicated\par RTC 3 to 6 months, labs prior\par

## 2020-10-07 ENCOUNTER — TRANSCRIPTION ENCOUNTER (OUTPATIENT)
Age: 55
End: 2020-10-07

## 2020-10-07 LAB
B BURGDOR IGG+IGM SER QL IB: NORMAL
T4 FREE SERPL-MCNC: 1.4 NG/DL
TSH SERPL-ACNC: 0.65 UIU/ML

## 2020-10-10 LAB

## 2021-01-22 ENCOUNTER — TRANSCRIPTION ENCOUNTER (OUTPATIENT)
Age: 56
End: 2021-01-22

## 2021-01-25 ENCOUNTER — TRANSCRIPTION ENCOUNTER (OUTPATIENT)
Age: 56
End: 2021-01-25

## 2021-03-10 ENCOUNTER — APPOINTMENT (OUTPATIENT)
Dept: ENDOCRINOLOGY | Facility: CLINIC | Age: 56
End: 2021-03-10
Payer: COMMERCIAL

## 2021-03-10 VITALS
WEIGHT: 168 LBS | HEART RATE: 98 BPM | DIASTOLIC BLOOD PRESSURE: 70 MMHG | OXYGEN SATURATION: 98 % | TEMPERATURE: 97.9 F | SYSTOLIC BLOOD PRESSURE: 120 MMHG | RESPIRATION RATE: 17 BRPM | HEIGHT: 62 IN | BODY MASS INDEX: 30.91 KG/M2

## 2021-03-10 DIAGNOSIS — Z11.59 ENCOUNTER FOR SCREENING FOR OTHER VIRAL DISEASES: ICD-10-CM

## 2021-03-10 PROCEDURE — 99214 OFFICE O/P EST MOD 30 MIN: CPT | Mod: 25

## 2021-03-10 PROCEDURE — 36415 COLL VENOUS BLD VENIPUNCTURE: CPT

## 2021-03-10 PROCEDURE — 99072 ADDL SUPL MATRL&STAF TM PHE: CPT

## 2021-03-10 RX ORDER — OLOPATADINE HYDROCHLORIDE 7 MG/ML
0.7 SOLUTION OPHTHALMIC
Qty: 2 | Refills: 0 | Status: DISCONTINUED | COMMUNITY
Start: 2017-05-20 | End: 2021-03-10

## 2021-03-10 RX ORDER — LEVOCETIRIZINE DIHYDROCHLORIDE 5 MG/1
5 TABLET ORAL
Qty: 30 | Refills: 0 | Status: DISCONTINUED | COMMUNITY
Start: 2017-07-29 | End: 2021-03-10

## 2021-03-10 NOTE — ASSESSMENT
[Levothyroxine] : The patient was instructed to take Levothyroxine on an empty stomach, separate from vitamins, and wait at least 30 minutes before eating [FreeTextEntry1] : - discussed with the patient current approaches to Hashimoto's thyroiditis\par - options to lower autoimmunity reviewed\par - advised on R+B of a combination T3/T4 therapy\par - diets reviewed\par - cont Tirosint 137 mcg qd and check labs today. Rescreen for covid ab\par - Proper intake of levothyroxine is reviewed in details, including on an empty stomach, with water only, at least one hour before taking any medications, vitamins, or supplements and three-four hours before taking iron or calcium.\par - should see a neuro and consider brain scan\par RTC 3 to 6 months, labs prior\par

## 2021-03-11 ENCOUNTER — TRANSCRIPTION ENCOUNTER (OUTPATIENT)
Age: 56
End: 2021-03-11

## 2021-03-11 LAB
T4 FREE SERPL-MCNC: 1.7 NG/DL
TSH SERPL-ACNC: 0.4 UIU/ML

## 2021-03-12 ENCOUNTER — TRANSCRIPTION ENCOUNTER (OUTPATIENT)
Age: 56
End: 2021-03-12

## 2021-03-12 LAB
TSH RECEPTOR AB: <1.1 IU/L
TSI ACT/NOR SER: <0.1 IU/L

## 2021-03-14 LAB
SARS-COV-2 IGG SERPL IA-ACNC: 0.07 INDEX
SARS-COV-2 IGG SERPL QL IA: NEGATIVE

## 2021-04-06 ENCOUNTER — RX RENEWAL (OUTPATIENT)
Age: 56
End: 2021-04-06

## 2021-08-19 ENCOUNTER — OUTPATIENT (OUTPATIENT)
Dept: OUTPATIENT SERVICES | Facility: HOSPITAL | Age: 56
LOS: 1 days | Discharge: HOME | End: 2021-08-19

## 2021-08-19 DIAGNOSIS — C50.912 MALIGNANT NEOPLASM OF UNSPECIFIED SITE OF LEFT FEMALE BREAST: ICD-10-CM

## 2021-08-20 ENCOUNTER — APPOINTMENT (OUTPATIENT)
Dept: HEMATOLOGY ONCOLOGY | Facility: CLINIC | Age: 56
End: 2021-08-20
Payer: COMMERCIAL

## 2021-08-20 ENCOUNTER — RESULT REVIEW (OUTPATIENT)
Age: 56
End: 2021-08-20

## 2021-08-20 ENCOUNTER — APPOINTMENT (OUTPATIENT)
Dept: HEMATOLOGY ONCOLOGY | Facility: CLINIC | Age: 56
End: 2021-08-20

## 2021-08-20 VITALS
DIASTOLIC BLOOD PRESSURE: 73 MMHG | WEIGHT: 176.37 LBS | HEIGHT: 62 IN | RESPIRATION RATE: 16 BRPM | SYSTOLIC BLOOD PRESSURE: 107 MMHG | TEMPERATURE: 97.3 F | HEART RATE: 85 BPM | BODY MASS INDEX: 32.46 KG/M2 | OXYGEN SATURATION: 99 %

## 2021-08-20 LAB
BASOPHILS # BLD AUTO: 0.06 K/UL — SIGNIFICANT CHANGE UP (ref 0–0.2)
BASOPHILS NFR BLD AUTO: 1 % — SIGNIFICANT CHANGE UP (ref 0–2)
EOSINOPHIL # BLD AUTO: 0.1 K/UL — SIGNIFICANT CHANGE UP (ref 0–0.5)
EOSINOPHIL NFR BLD AUTO: 1.7 % — SIGNIFICANT CHANGE UP (ref 0–6)
HCT VFR BLD CALC: 42.7 % — SIGNIFICANT CHANGE UP (ref 34.5–45)
HGB BLD-MCNC: 14.1 G/DL — SIGNIFICANT CHANGE UP (ref 11.5–15.5)
IMM GRANULOCYTES NFR BLD AUTO: 0.5 % — SIGNIFICANT CHANGE UP (ref 0–1.5)
LYMPHOCYTES # BLD AUTO: 1.42 K/UL — SIGNIFICANT CHANGE UP (ref 1–3.3)
LYMPHOCYTES # BLD AUTO: 23.4 % — SIGNIFICANT CHANGE UP (ref 13–44)
MCHC RBC-ENTMCNC: 29.8 PG — SIGNIFICANT CHANGE UP (ref 27–34)
MCHC RBC-ENTMCNC: 33 G/DL — SIGNIFICANT CHANGE UP (ref 32–36)
MCV RBC AUTO: 90.3 FL — SIGNIFICANT CHANGE UP (ref 80–100)
MONOCYTES # BLD AUTO: 0.54 K/UL — SIGNIFICANT CHANGE UP (ref 0–0.9)
MONOCYTES NFR BLD AUTO: 8.9 % — SIGNIFICANT CHANGE UP (ref 2–14)
NEUTROPHILS # BLD AUTO: 3.91 K/UL — SIGNIFICANT CHANGE UP (ref 1.8–7.4)
NEUTROPHILS NFR BLD AUTO: 64.5 % — SIGNIFICANT CHANGE UP (ref 43–77)
NRBC # BLD: 0 /100 WBCS — SIGNIFICANT CHANGE UP (ref 0–0)
PLATELET # BLD AUTO: 285 K/UL — SIGNIFICANT CHANGE UP (ref 150–400)
RBC # BLD: 4.73 M/UL — SIGNIFICANT CHANGE UP (ref 3.8–5.2)
RBC # FLD: 12.3 % — SIGNIFICANT CHANGE UP (ref 10.3–14.5)
WBC # BLD: 6.06 K/UL — SIGNIFICANT CHANGE UP (ref 3.8–10.5)
WBC # FLD AUTO: 6.06 K/UL — SIGNIFICANT CHANGE UP (ref 3.8–10.5)

## 2021-08-20 PROCEDURE — 99213 OFFICE O/P EST LOW 20 MIN: CPT

## 2021-08-20 NOTE — ASSESSMENT
[FreeTextEntry1] : stage IIIa ER positive, HER-2/benny positive left breast cancer in 2006, status post neoadjuvant ACTH followed by bilateral mastectomy with implants in May 2007, radiation therapy, BSO in 2008, exemestane from July 2007 to February 2011. Exemestane was stopped in February 2011 due to pulmonary embolism and abnormal LFTs. Anastrozole was started in April 2013, which she took inconsistently for a year and a half and eventually stopped in 2015 due to significant arthralgias.\par \par - Breast ca: She is doing well. Clinically TARSHA. No sequela from chemotherapy or surgery. Encourage healthy diet and exercise. Routine labs today\par - Post mastectomy pain: s/p injections for myofascial syndrome. Pain improved. Rec to f/u with plastics \par - Low Vit D: Levels improved with suppls. Continue calcium and vitamin D for bone health\par - Continue followup with primary care for age-appropriate malignancy screening\par - Educated about s/sx of recurrence\par ENCOURAGED to get covid vaccine \par - RTC  1y

## 2021-08-20 NOTE — HISTORY OF PRESENT ILLNESS
[de-identified] : 53-year-old lady with history of stage IIIa left breast cancer, ER positive and HER-2/benny positive diagnosed in 2006.\par \par She initially presented at age 40 years when she felt a left breast lump and noted to have matted lymph nodes on exam. Core biopsy revealed adenocarcinoma, ER positive, [place] positive and HER-2/benny positive. She received neoadjuvant chemotherapy with AC followed by TH from December 2006 to May 2007. She underwent bilateral mastectomy implants in May 2007. JCB adjuvant left chest wall radiation from July 2007 to August 2007. She received ovarian suppression (SOFT trial) plus exemestane from July 2007. She underwent bilateral salpingo-oophorectomy in 2008 she continued exemestane from July 2007 to February 2011. Patient developed pulmonary embolism in Feb 2011.. She was treated with Lovenox for 6 months. Exemestane was stopped at that time 2 new pulmonary embolism and abnormal LFTs. She was restarted on anastrozole in April 2013 but stopped in Aug 2015 due to intolerable side effects. SHe didn't want to consider prolonged AI use.  [Treatment Protocol] : Treatment Protocol [FreeTextEntry1] : ACTH 12/2006 - 5/2007.\par Lupron + Exemestane 7/2007- 7/2008\par Exemestane completed 2/2011. [de-identified] : Ms. FAIZAN MORTON is here for f/u of left breast cancer completed endocrine therapy 2/2011. \par No residual s/e from chemo or surgery. No lymphedema. No neuropathy, no cardiac S/E. No bone pain, ROS negative\par She has post mastectomy pain, s/p injection by DR Downs 2 yrs ago and felt better. Implants last replaced 2009. Dr Balbuena. 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 She is exercising, walking, swimming, rowing. Gained a few wt \par LMP 2006 (BSO)\par Thyroid issues controlled with meds\par Up to date with cancer screening- GYN last week\par colonoscopy she had at 51\par ENCOURAGED to get covid vaccine

## 2021-08-20 NOTE — PHYSICAL EXAM
[Fully active, able to carry on all pre-disease performance without restriction] : Status 0 - Fully active, able to carry on all pre-disease performance without restriction [Normal] : affect appropriate [de-identified] : bilateral mastectomy with implants, no chest wall or axillary nodules

## 2021-08-31 LAB
25(OH)D3 SERPL-MCNC: 46.8 NG/ML
ALBUMIN SERPL ELPH-MCNC: 4.7 G/DL
ALP BLD-CCNC: 78 U/L
ALT SERPL-CCNC: 45 U/L
ANION GAP SERPL CALC-SCNC: 11 MMOL/L
AST SERPL-CCNC: 31 U/L
BILIRUB SERPL-MCNC: 0.2 MG/DL
BUN SERPL-MCNC: 15 MG/DL
CALCIUM SERPL-MCNC: 9.9 MG/DL
CHLORIDE SERPL-SCNC: 103 MMOL/L
CO2 SERPL-SCNC: 25 MMOL/L
CREAT SERPL-MCNC: 0.77 MG/DL
GLUCOSE SERPL-MCNC: 94 MG/DL
POTASSIUM SERPL-SCNC: 4.3 MMOL/L
PROT SERPL-MCNC: 7.4 G/DL
SODIUM SERPL-SCNC: 139 MMOL/L
T3FREE SERPL-MCNC: 3.01 PG/ML
T4 FREE SERPL-MCNC: 1.5 NG/DL
TSH SERPL-ACNC: 0.87 UIU/ML

## 2022-02-02 ENCOUNTER — NON-APPOINTMENT (OUTPATIENT)
Age: 57
End: 2022-02-02

## 2022-02-02 ENCOUNTER — APPOINTMENT (OUTPATIENT)
Dept: INTERNAL MEDICINE | Facility: CLINIC | Age: 57
End: 2022-02-02
Payer: COMMERCIAL

## 2022-02-02 VITALS
TEMPERATURE: 98.7 F | HEART RATE: 85 BPM | OXYGEN SATURATION: 99 % | DIASTOLIC BLOOD PRESSURE: 82 MMHG | SYSTOLIC BLOOD PRESSURE: 122 MMHG | WEIGHT: 170 LBS | HEIGHT: 62 IN | BODY MASS INDEX: 31.28 KG/M2

## 2022-02-02 DIAGNOSIS — N64.89 OTHER SPECIFIED DISORDERS OF BREAST: ICD-10-CM

## 2022-02-02 DIAGNOSIS — U09.9 POST COVID-19 CONDITION, UNSPECIFIED: ICD-10-CM

## 2022-02-02 DIAGNOSIS — Z90.10 OTHER SPECIFIED DISORDERS OF BREAST: ICD-10-CM

## 2022-02-02 PROCEDURE — 99214 OFFICE O/P EST MOD 30 MIN: CPT

## 2022-02-02 NOTE — PHYSICAL EXAM
[No Acute Distress] : no acute distress [Normal] : no acute distress, well nourished, well developed and well-appearing [Normal Sclera/Conjunctiva] : normal sclera/conjunctiva [Normal Outer Ear/Nose] : the outer ears and nose were normal in appearance [No JVD] : no jugular venous distention [No Lymphadenopathy] : no lymphadenopathy [Supple] : supple [No Respiratory Distress] : no respiratory distress  [No Accessory Muscle Use] : no accessory muscle use [Clear to Auscultation] : lungs were clear to auscultation bilaterally [Normal Rate] : normal rate  [Regular Rhythm] : with a regular rhythm [Normal S1, S2] : normal S1 and S2 [No Edema] : there was no peripheral edema [Soft] : abdomen soft [Non Tender] : non-tender [Non-distended] : non-distended [No HSM] : no HSM [Normal Bowel Sounds] : normal bowel sounds [Coordination Grossly Intact] : coordination grossly intact [No Focal Deficits] : no focal deficits [Normal Gait] : normal gait

## 2022-02-03 DIAGNOSIS — Z01.818 ENCOUNTER FOR OTHER PREPROCEDURAL EXAMINATION: ICD-10-CM

## 2022-02-03 LAB
ALBUMIN SERPL ELPH-MCNC: 4.7 G/DL
ALP BLD-CCNC: 78 U/L
ALT SERPL-CCNC: 37 U/L
ANION GAP SERPL CALC-SCNC: 16 MMOL/L
AST SERPL-CCNC: 28 U/L
BASOPHILS # BLD AUTO: 0.05 K/UL
BASOPHILS NFR BLD AUTO: 0.9 %
BILIRUB SERPL-MCNC: <0.2 MG/DL
BUN SERPL-MCNC: 22 MG/DL
CALCIUM SERPL-MCNC: 9.9 MG/DL
CHLORIDE SERPL-SCNC: 100 MMOL/L
CO2 SERPL-SCNC: 23 MMOL/L
CREAT SERPL-MCNC: 0.87 MG/DL
DEPRECATED D DIMER PPP IA-ACNC: <150 NG/ML DDU
EOSINOPHIL # BLD AUTO: 0.14 K/UL
EOSINOPHIL NFR BLD AUTO: 2.4 %
GLUCOSE SERPL-MCNC: 124 MG/DL
HCT VFR BLD CALC: 39.5 %
HGB BLD-MCNC: 12.9 G/DL
IMM GRANULOCYTES NFR BLD AUTO: 0.2 %
LYMPHOCYTES # BLD AUTO: 1.95 K/UL
LYMPHOCYTES NFR BLD AUTO: 33.2 %
MAN DIFF?: NORMAL
MCHC RBC-ENTMCNC: 29.1 PG
MCHC RBC-ENTMCNC: 32.7 GM/DL
MCV RBC AUTO: 89 FL
MONOCYTES # BLD AUTO: 0.46 K/UL
MONOCYTES NFR BLD AUTO: 7.8 %
NEUTROPHILS # BLD AUTO: 3.26 K/UL
NEUTROPHILS NFR BLD AUTO: 55.5 %
PLATELET # BLD AUTO: 288 K/UL
POTASSIUM SERPL-SCNC: 3.8 MMOL/L
PROT SERPL-MCNC: 7.1 G/DL
RBC # BLD: 4.44 M/UL
RBC # FLD: 12.4 %
SODIUM SERPL-SCNC: 139 MMOL/L
WBC # FLD AUTO: 5.87 K/UL

## 2022-02-10 NOTE — HISTORY OF PRESENT ILLNESS
[No Pertinent Cardiac History] : no history of aortic stenosis, atrial fibrillation, coronary artery disease, recent myocardial infarction, or implantable device/pacemaker [No Pertinent Pulmonary History] : no history of asthma, COPD, sleep apnea, or smoking [No Adverse Anesthesia Reaction] : no adverse anesthesia reaction in self or family member [Chronic Anticoagulation] : no chronic anticoagulation [Chronic Kidney Disease] : no chronic kidney disease [Diabetes] : no diabetes [FreeTextEntry1] : breast reconstruction [FreeTextEntry2] : 2/14/2022. [FreeTextEntry3] : michael blackmon [FreeTextEntry4] : she had covid in january but feels recovered.  she had a pulmonary embolism in 2009 likely due to hormonal therapy for her breast cancer [FreeTextEntry5] : Had pulmonary  embolism

## 2022-03-24 ENCOUNTER — APPOINTMENT (OUTPATIENT)
Dept: PLASTIC SURGERY | Facility: CLINIC | Age: 57
End: 2022-03-24

## 2022-04-06 ENCOUNTER — RX RENEWAL (OUTPATIENT)
Age: 57
End: 2022-04-06

## 2022-04-11 PROBLEM — Z11.59 SCREENING FOR VIRAL DISEASE: Status: ACTIVE | Noted: 2021-03-10

## 2022-04-20 ENCOUNTER — APPOINTMENT (OUTPATIENT)
Dept: ENDOCRINOLOGY | Facility: CLINIC | Age: 57
End: 2022-04-20
Payer: COMMERCIAL

## 2022-04-20 VITALS
WEIGHT: 176 LBS | BODY MASS INDEX: 32.39 KG/M2 | DIASTOLIC BLOOD PRESSURE: 64 MMHG | TEMPERATURE: 97.9 F | HEART RATE: 65 BPM | OXYGEN SATURATION: 98 % | HEIGHT: 62 IN | SYSTOLIC BLOOD PRESSURE: 122 MMHG

## 2022-04-20 PROCEDURE — 36415 COLL VENOUS BLD VENIPUNCTURE: CPT

## 2022-04-20 PROCEDURE — 99214 OFFICE O/P EST MOD 30 MIN: CPT | Mod: 25

## 2022-04-20 NOTE — ASSESSMENT
[Levothyroxine] : The patient was instructed to take Levothyroxine on an empty stomach, separate from vitamins, and wait at least 30 minutes before eating [FreeTextEntry1] : 1. Hypothyroidism. AITD\par - discussed with the patient current approaches to Hashimoto's thyroiditis\par - options to lower autoimmunity reviewed\par - advised on R+B of a combination T3/T4 therapy\par - diets reviewed\par - cont Tirosint 137 mcg qd and check labs today.\par - Proper intake of levothyroxine is reviewed in details, including on an empty stomach, with water only, at least one hour before taking any medications, vitamins, or supplements and three-four hours before taking iron or calcium.\par \par 2. Obesity\par - Current approaches to weight management are discussed with the patient. \par Suggested extensive nutritional education program. Proper dietary restrictions and exercise routines discussed. Medical weight loss therapies were reviewed with the patient. We discussed trial of Qsymia or Wegovy (R+B). She wants to think about it\par \par RTC 3 to 6 months, labs prior\par

## 2022-04-20 NOTE — HISTORY OF PRESENT ILLNESS
[FreeTextEntry1] : 56 year female  f/u  for hypothyroidism management. \par \par *** Apr 20, 2022 ***\par \par feels great, exercising daily, doing cross fit, but still unable to lose weight\par had covid in january, recovered well\par taking Tirosint 137 mcg\par no recent thyroid tests\par \par *** Mar 10, 2021 ***\par \par returned earlier. developed migraines x past 2 months, seeing ent/ophthalmologist. treated for sinus headaches. concerned if her thyroid is responsible. did not see neuro, no brain MRI yet. no recent covid\par saw allergist- started on montelukast, Claritin\par \par *** Oct 06, 2020 ***\par \par on tirosint 137 mcg. feels well. concerned with having multiple tick bites about a month ago. No rash or any other symptoms\par no recent labs\par \par *** Dec 16, 2019 ***\par \par feels better on a lower dose  tirosint 137 mcg. no more jittery feeling\par no recent labs\par prior TSH- 15.7 (off meds x 3 weeks)\par \par *** Oct 15, 2019 ***\par \par feels well on Tirosint overall, but with occas palpitations past month\par still on 175 mcg qd\par Hair loss improved a lot.\par TSH- 0.01, FT4- 1.9\par neg celiac panel\par \par HPI:\par Ms. MORTON  was diagnosed with Hashimoto's hypothyroidism in 2015. Previously under care of Dr. Menjivar.\par Initially on synthroid 112mcg, eventually switched to [place] thyroid because she was not feeling well. She's presently on [place] 120 mg, but states that she feels the same. Still complains of fatigue, difficulties losing weight, hair thinning\par Denies family history of thyroid cancer or history of radiation exposure to head and neck area in a childhood.\par TSH- 0.31 <-- 4.92 <-- 2.71, \par FT4 DD- 0.65\par prior T4- 4.8, T3- 113\par She recalls a normal thyroid US done several years ago\par Had DXA last week- results are still pending

## 2022-04-25 ENCOUNTER — TRANSCRIPTION ENCOUNTER (OUTPATIENT)
Age: 57
End: 2022-04-25

## 2022-04-25 LAB
25(OH)D3 SERPL-MCNC: 46.1 NG/ML
ALBUMIN SERPL ELPH-MCNC: 4.8 G/DL
ALP BLD-CCNC: 73 U/L
ALT SERPL-CCNC: 36 U/L
ANION GAP SERPL CALC-SCNC: 15 MMOL/L
AST SERPL-CCNC: 28 U/L
BASOPHILS # BLD AUTO: 0.06 K/UL
BASOPHILS NFR BLD AUTO: 1.1 %
BILIRUB SERPL-MCNC: 0.2 MG/DL
BUN SERPL-MCNC: 15 MG/DL
CALCIUM SERPL-MCNC: 10.4 MG/DL
CHLORIDE SERPL-SCNC: 103 MMOL/L
CO2 SERPL-SCNC: 26 MMOL/L
CREAT SERPL-MCNC: 0.8 MG/DL
EGFR: 87 ML/MIN/1.73M2
EOSINOPHIL # BLD AUTO: 0.15 K/UL
EOSINOPHIL NFR BLD AUTO: 2.8 %
ESTIMATED AVERAGE GLUCOSE: 117 MG/DL
GLUCOSE SERPL-MCNC: 79 MG/DL
HBA1C MFR BLD HPLC: 5.7 %
HCT VFR BLD CALC: 46.5 %
HGB BLD-MCNC: 14.6 G/DL
IMM GRANULOCYTES NFR BLD AUTO: 0.4 %
LYMPHOCYTES # BLD AUTO: 1.36 K/UL
LYMPHOCYTES NFR BLD AUTO: 25.5 %
MAN DIFF?: NORMAL
MCHC RBC-ENTMCNC: 28.7 PG
MCHC RBC-ENTMCNC: 31.4 GM/DL
MCV RBC AUTO: 91.5 FL
MONOCYTES # BLD AUTO: 0.43 K/UL
MONOCYTES NFR BLD AUTO: 8.1 %
NEUTROPHILS # BLD AUTO: 3.32 K/UL
NEUTROPHILS NFR BLD AUTO: 62.1 %
PLATELET # BLD AUTO: 277 K/UL
POTASSIUM SERPL-SCNC: 4.5 MMOL/L
PROT SERPL-MCNC: 7.4 G/DL
RBC # BLD: 5.08 M/UL
RBC # FLD: 12 %
SODIUM SERPL-SCNC: 144 MMOL/L
T4 FREE SERPL-MCNC: 1.8 NG/DL
THYROGLOB AB SERPL-ACNC: <20 IU/ML
THYROPEROXIDASE AB SERPL IA-ACNC: 10.9 IU/ML
TSH SERPL-ACNC: 0.06 UIU/ML
WBC # FLD AUTO: 5.34 K/UL

## 2022-08-11 ENCOUNTER — OUTPATIENT (OUTPATIENT)
Dept: OUTPATIENT SERVICES | Facility: HOSPITAL | Age: 57
LOS: 1 days | Discharge: HOME | End: 2022-08-11

## 2022-08-11 DIAGNOSIS — C50.912 MALIGNANT NEOPLASM OF UNSPECIFIED SITE OF LEFT FEMALE BREAST: ICD-10-CM

## 2022-08-17 ENCOUNTER — RESULT REVIEW (OUTPATIENT)
Age: 57
End: 2022-08-17

## 2022-08-17 ENCOUNTER — APPOINTMENT (OUTPATIENT)
Dept: HEMATOLOGY ONCOLOGY | Facility: CLINIC | Age: 57
End: 2022-08-17

## 2022-08-17 VITALS
SYSTOLIC BLOOD PRESSURE: 115 MMHG | HEIGHT: 62 IN | TEMPERATURE: 97.3 F | BODY MASS INDEX: 28.8 KG/M2 | WEIGHT: 156.53 LBS | HEART RATE: 75 BPM | RESPIRATION RATE: 16 BRPM | OXYGEN SATURATION: 96 % | DIASTOLIC BLOOD PRESSURE: 77 MMHG

## 2022-08-17 DIAGNOSIS — C50.919 MALIGNANT NEOPLASM OF UNSPECIFIED SITE OF UNSPECIFIED FEMALE BREAST: ICD-10-CM

## 2022-08-17 LAB
BASOPHILS # BLD AUTO: 0.06 K/UL — SIGNIFICANT CHANGE UP (ref 0–0.2)
BASOPHILS NFR BLD AUTO: 0.8 % — SIGNIFICANT CHANGE UP (ref 0–2)
EOSINOPHIL # BLD AUTO: 0.13 K/UL — SIGNIFICANT CHANGE UP (ref 0–0.5)
EOSINOPHIL NFR BLD AUTO: 1.7 % — SIGNIFICANT CHANGE UP (ref 0–6)
HCT VFR BLD CALC: 41.8 % — SIGNIFICANT CHANGE UP (ref 34.5–45)
HGB BLD-MCNC: 13.7 G/DL — SIGNIFICANT CHANGE UP (ref 11.5–15.5)
IMM GRANULOCYTES NFR BLD AUTO: 0.5 % — SIGNIFICANT CHANGE UP (ref 0–1.5)
LYMPHOCYTES # BLD AUTO: 2.06 K/UL — SIGNIFICANT CHANGE UP (ref 1–3.3)
LYMPHOCYTES # BLD AUTO: 27.6 % — SIGNIFICANT CHANGE UP (ref 13–44)
MCHC RBC-ENTMCNC: 29.6 PG — SIGNIFICANT CHANGE UP (ref 27–34)
MCHC RBC-ENTMCNC: 32.8 G/DL — SIGNIFICANT CHANGE UP (ref 32–36)
MCV RBC AUTO: 90.3 FL — SIGNIFICANT CHANGE UP (ref 80–100)
MONOCYTES # BLD AUTO: 0.49 K/UL — SIGNIFICANT CHANGE UP (ref 0–0.9)
MONOCYTES NFR BLD AUTO: 6.6 % — SIGNIFICANT CHANGE UP (ref 2–14)
NEUTROPHILS # BLD AUTO: 4.68 K/UL — SIGNIFICANT CHANGE UP (ref 1.8–7.4)
NEUTROPHILS NFR BLD AUTO: 62.8 % — SIGNIFICANT CHANGE UP (ref 43–77)
NRBC # BLD: 0 /100 WBCS — SIGNIFICANT CHANGE UP (ref 0–0)
PLATELET # BLD AUTO: 298 K/UL — SIGNIFICANT CHANGE UP (ref 150–400)
RBC # BLD: 4.63 M/UL — SIGNIFICANT CHANGE UP (ref 3.8–5.2)
RBC # FLD: 12.3 % — SIGNIFICANT CHANGE UP (ref 10.3–14.5)
WBC # BLD: 7.46 K/UL — SIGNIFICANT CHANGE UP (ref 3.8–10.5)
WBC # FLD AUTO: 7.46 K/UL — SIGNIFICANT CHANGE UP (ref 3.8–10.5)

## 2022-08-17 PROCEDURE — 99213 OFFICE O/P EST LOW 20 MIN: CPT

## 2022-08-17 NOTE — HISTORY OF PRESENT ILLNESS
[de-identified] : 53-year-old lady with history of stage IIIa left breast cancer, ER positive and HER-2/benny positive diagnosed in 2006.\par \par She initially presented at age 40 years when she felt a left breast lump and noted to have matted lymph nodes on exam. Core biopsy revealed adenocarcinoma, ER positive, [place] positive and HER-2/benny positive. She received neoadjuvant chemotherapy with AC followed by TH from December 2006 to May 2007. She underwent bilateral mastectomy implants in May 2007. JCB adjuvant left chest wall radiation from July 2007 to August 2007. She received ovarian suppression (SOFT trial) plus exemestane from July 2007. She underwent bilateral salpingo-oophorectomy in 2008 she continued exemestane from July 2007 to February 2011. Patient developed pulmonary embolism in Feb 2011.. She was treated with Lovenox for 6 months. Exemestane was stopped at that time 2 new pulmonary embolism and abnormal LFTs. She was restarted on anastrozole in April 2013 but stopped in Aug 2015 due to intolerable side effects. SHe didn't want to consider prolonged AI use.  [Treatment Protocol] : Treatment Protocol [FreeTextEntry1] : ACTH 12/2006 - 5/2007.\par Lupron + Exemestane 7/2007- 7/2008\par Exemestane completed 2/2011. [de-identified] : Ms. FAIZAN MORTON is here for f/u of left breast cancer completed endocrine therapy 2/2011. \par No residual s/e from chemo or surgery. No lymphedema. No neuropathy, no cardiac S/E. No bone pain, ROS negative\par She has post mastectomy pain, s/p injection by DR Downs 2 yrs ago and felt better. Implants last replaced 2009. Dr Balbuena\par Working full time (science aid in a school), energy good, no change in appetite, no wt loss. She is exercising, walking, swimming, rowing. Lost wt intentionally\par LMP 2006 (BSO)\par Thyroid issues controlled with meds\par Up to date with cancer screening- GYN last week\par colonoscopy she had at 51

## 2022-08-17 NOTE — PHYSICAL EXAM
[Fully active, able to carry on all pre-disease performance without restriction] : Status 0 - Fully active, able to carry on all pre-disease performance without restriction [Normal] : affect appropriate [de-identified] : bilateral mastectomy with implants, no chest wall or axillary nodules

## 2022-08-17 NOTE — ASSESSMENT
[FreeTextEntry1] : stage IIIa ER positive, HER-2/benny positive left breast cancer in 2006, status post neoadjuvant ACTH followed by bilateral mastectomy with implants in May 2007, radiation therapy, BSO in 2008, exemestane from July 2007 to February 2011. Exemestane was stopped in February 2011 due to pulmonary embolism and abnormal LFTs. Anastrozole was started in April 2013, which she took inconsistently for a year and a half and eventually stopped in 2015 due to significant arthralgias.\par \par - Breast ca: She is doing well. Clinically TARSHA. No sequela from chemotherapy or surgery. Encourage healthy diet and exercise. Routine labs today\par - Post mastectomy pain: s/p injections for myofascial syndrome. Pain improved. Rec to f/u with plastics \par - Low Vit D: Levels improved with suppls. Continue calcium and vitamin D for bone health\par - Continue followup with primary care for age-appropriate malignancy screening\par - Educated about s/sx of recurrence\par \par - RTC  1y

## 2022-08-18 LAB
25(OH)D3 SERPL-MCNC: 62.8 NG/ML
ALBUMIN SERPL ELPH-MCNC: 4.8 G/DL
ALP BLD-CCNC: 71 U/L
ALT SERPL-CCNC: 33 U/L
ANION GAP SERPL CALC-SCNC: 12 MMOL/L
AST SERPL-CCNC: 23 U/L
BILIRUB SERPL-MCNC: 0.2 MG/DL
BUN SERPL-MCNC: 24 MG/DL
CALCIUM SERPL-MCNC: 10.1 MG/DL
CANCER AG27-29 SERPL-ACNC: 11.4 U/ML
CEA SERPL-MCNC: 1.3 NG/ML
CHLORIDE SERPL-SCNC: 102 MMOL/L
CO2 SERPL-SCNC: 26 MMOL/L
CREAT SERPL-MCNC: 0.86 MG/DL
EGFR: 80 ML/MIN/1.73M2
GLUCOSE SERPL-MCNC: 99 MG/DL
POTASSIUM SERPL-SCNC: 4.3 MMOL/L
PROT SERPL-MCNC: 7.2 G/DL
SODIUM SERPL-SCNC: 140 MMOL/L

## 2022-08-19 ENCOUNTER — APPOINTMENT (OUTPATIENT)
Dept: HEMATOLOGY ONCOLOGY | Facility: CLINIC | Age: 57
End: 2022-08-19

## 2022-10-03 ENCOUNTER — APPOINTMENT (OUTPATIENT)
Dept: PLASTIC SURGERY | Facility: CLINIC | Age: 57
End: 2022-10-03

## 2022-10-03 VITALS
DIASTOLIC BLOOD PRESSURE: 84 MMHG | OXYGEN SATURATION: 99 % | HEIGHT: 62 IN | HEART RATE: 97 BPM | TEMPERATURE: 97.3 F | WEIGHT: 152 LBS | BODY MASS INDEX: 27.97 KG/M2 | SYSTOLIC BLOOD PRESSURE: 130 MMHG

## 2022-10-03 DIAGNOSIS — Z42.1 ENCOUNTER FOR BREAST RECONSTRUCTION FOLLOWING MASTECTOMY: ICD-10-CM

## 2022-10-03 PROCEDURE — 99215 OFFICE O/P EST HI 40 MIN: CPT

## 2022-10-05 PROBLEM — Z42.1 ENCOUNTER FOR BREAST RECONSTRUCTION FOLLOWING MASTECTOMY: Status: ACTIVE | Noted: 2019-09-09

## 2022-10-05 RX ORDER — FLUTICASONE PROPIONATE 50 UG/1
50 SPRAY, METERED NASAL
Qty: 16 | Refills: 0 | Status: ACTIVE | COMMUNITY
Start: 2022-09-27

## 2022-10-05 RX ORDER — MONTELUKAST 10 MG/1
10 TABLET, FILM COATED ORAL
Qty: 30 | Refills: 0 | Status: ACTIVE | COMMUNITY
Start: 2022-09-27

## 2022-10-05 RX ORDER — MOMETASONE 50 UG/1
50 SPRAY, METERED NASAL
Qty: 51 | Refills: 0 | Status: ACTIVE | COMMUNITY
Start: 2022-05-13

## 2022-10-05 RX ORDER — AMOXICILLIN 875 MG/1
875 TABLET, FILM COATED ORAL
Qty: 20 | Refills: 0 | Status: DISCONTINUED | COMMUNITY
Start: 2022-09-09

## 2022-10-05 RX ORDER — AZELASTINE HYDROCHLORIDE 137 UG/1
137 SPRAY, METERED NASAL
Qty: 30 | Refills: 0 | Status: ACTIVE | COMMUNITY
Start: 2022-09-27

## 2022-10-13 ENCOUNTER — RESULT REVIEW (OUTPATIENT)
Age: 57
End: 2022-10-13

## 2022-10-13 ENCOUNTER — OUTPATIENT (OUTPATIENT)
Dept: OUTPATIENT SERVICES | Facility: HOSPITAL | Age: 57
LOS: 1 days | End: 2022-10-13
Payer: COMMERCIAL

## 2022-10-13 ENCOUNTER — APPOINTMENT (OUTPATIENT)
Dept: ULTRASOUND IMAGING | Facility: CLINIC | Age: 57
End: 2022-10-13

## 2022-10-13 DIAGNOSIS — Z00.00 ENCOUNTER FOR GENERAL ADULT MEDICAL EXAMINATION WITHOUT ABNORMAL FINDINGS: ICD-10-CM

## 2022-10-13 PROCEDURE — 76641 ULTRASOUND BREAST COMPLETE: CPT | Mod: 26,50

## 2022-10-13 PROCEDURE — 76641 ULTRASOUND BREAST COMPLETE: CPT

## 2022-11-09 RX ORDER — ALPRAZOLAM 0.5 MG/1
0.5 TABLET ORAL
Qty: 2 | Refills: 0 | Status: ACTIVE | COMMUNITY
Start: 2022-11-09 | End: 1900-01-01

## 2022-12-15 ENCOUNTER — APPOINTMENT (OUTPATIENT)
Dept: MRI IMAGING | Facility: IMAGING CENTER | Age: 57
End: 2022-12-15

## 2022-12-15 ENCOUNTER — OUTPATIENT (OUTPATIENT)
Dept: OUTPATIENT SERVICES | Facility: HOSPITAL | Age: 57
LOS: 1 days | End: 2022-12-15
Payer: COMMERCIAL

## 2022-12-15 DIAGNOSIS — Z42.1 ENCOUNTER FOR BREAST RECONSTRUCTION FOLLOWING MASTECTOMY: ICD-10-CM

## 2022-12-15 PROCEDURE — C8920: CPT

## 2022-12-15 PROCEDURE — 74185 MRA ABD W OR W/O CNTRST: CPT | Mod: 26

## 2022-12-15 PROCEDURE — C8902: CPT

## 2022-12-15 PROCEDURE — 72198 MR ANGIO PELVIS W/O & W/DYE: CPT | Mod: 26

## 2022-12-15 PROCEDURE — A9585: CPT

## 2023-01-03 ENCOUNTER — RX RENEWAL (OUTPATIENT)
Age: 58
End: 2023-01-03

## 2023-01-17 ENCOUNTER — APPOINTMENT (OUTPATIENT)
Dept: MRI IMAGING | Facility: IMAGING CENTER | Age: 58
End: 2023-01-17
Payer: COMMERCIAL

## 2023-01-17 ENCOUNTER — RESULT REVIEW (OUTPATIENT)
Age: 58
End: 2023-01-17

## 2023-01-17 ENCOUNTER — OUTPATIENT (OUTPATIENT)
Dept: OUTPATIENT SERVICES | Facility: HOSPITAL | Age: 58
LOS: 1 days | End: 2023-01-17
Payer: COMMERCIAL

## 2023-01-17 DIAGNOSIS — Z00.8 ENCOUNTER FOR OTHER GENERAL EXAMINATION: ICD-10-CM

## 2023-01-17 PROCEDURE — C8908: CPT

## 2023-01-17 PROCEDURE — C8937: CPT

## 2023-01-17 PROCEDURE — A9585: CPT

## 2023-01-17 PROCEDURE — 77049 MRI BREAST C-+ W/CAD BI: CPT | Mod: 26

## 2023-02-09 ENCOUNTER — TRANSCRIPTION ENCOUNTER (OUTPATIENT)
Age: 58
End: 2023-02-09

## 2023-02-09 RX ORDER — LEVOTHYROXINE SODIUM 125 UG/1
125 CAPSULE ORAL
Qty: 90 | Refills: 0 | Status: DISCONTINUED | COMMUNITY
Start: 2019-08-12 | End: 2023-01-10

## 2023-03-02 RX ORDER — LEVOTHYROXINE SODIUM 137 UG/1
137 CAPSULE ORAL
Refills: 0 | Status: ACTIVE | COMMUNITY
Start: 2022-04-06

## 2023-04-05 ENCOUNTER — RX RENEWAL (OUTPATIENT)
Age: 58
End: 2023-04-05

## 2023-04-07 ENCOUNTER — RX RENEWAL (OUTPATIENT)
Age: 58
End: 2023-04-07

## 2023-05-02 ENCOUNTER — APPOINTMENT (OUTPATIENT)
Dept: ENDOCRINOLOGY | Facility: CLINIC | Age: 58
End: 2023-05-02
Payer: COMMERCIAL

## 2023-05-02 VITALS
TEMPERATURE: 97.3 F | OXYGEN SATURATION: 98 % | HEART RATE: 96 BPM | BODY MASS INDEX: 28.71 KG/M2 | RESPIRATION RATE: 16 BRPM | DIASTOLIC BLOOD PRESSURE: 62 MMHG | HEIGHT: 62 IN | WEIGHT: 156 LBS | SYSTOLIC BLOOD PRESSURE: 116 MMHG

## 2023-05-02 DIAGNOSIS — R79.89 OTHER SPECIFIED ABNORMAL FINDINGS OF BLOOD CHEMISTRY: ICD-10-CM

## 2023-05-02 PROCEDURE — 99214 OFFICE O/P EST MOD 30 MIN: CPT | Mod: 25

## 2023-05-02 PROCEDURE — 36415 COLL VENOUS BLD VENIPUNCTURE: CPT

## 2023-05-02 NOTE — HISTORY OF PRESENT ILLNESS
[FreeTextEntry1] : 57 year female  f/u  for hypothyroidism management. \par \par *** May 02, 2023 ***\par \par feels well. staying on Optavia, exercising 3-5 times a week. lost 20 lbs \par staying on L-thyroxine 125 mcg. thinks that felt a bit better when taking brand Tirosint\par no recent labs\par \par *** Apr 20, 2022 ***\par \par feels great, exercising daily, doing cross fit, but still unable to lose weight\par had covid in january, recovered well\par taking Tirosint 137 mcg\par no recent thyroid tests\par \par *** Mar 10, 2021 ***\par \par returned earlier. developed migraines x past 2 months, seeing ent/ophthalmologist. treated for sinus headaches. concerned if her thyroid is responsible. did not see neuro, no brain MRI yet. no recent covid\par saw allergist- started on montelukast, Claritin\par \par *** Oct 06, 2020 ***\par \par on tirosint 137 mcg. feels well. concerned with having multiple tick bites about a month ago. No rash or any other symptoms\par no recent labs\par \par *** Dec 16, 2019 ***\par \par feels better on a lower dose  tirosint 137 mcg. no more jittery feeling\par no recent labs\par prior TSH- 15.7 (off meds x 3 weeks)\par \par *** Oct 15, 2019 ***\par \par feels well on Tirosint overall, but with occas palpitations past month\par still on 175 mcg qd\par Hair loss improved a lot.\par TSH- 0.01, FT4- 1.9\par neg celiac panel\par \par HPI:\par Ms. MORTON  was diagnosed with Hashimoto's hypothyroidism in 2015. Previously under care of Dr. Menjivar.\par Initially on synthroid 112mcg, eventually switched to [place] thyroid because she was not feeling well. She's presently on [place] 120 mg, but states that she feels the same. Still complains of fatigue, difficulties losing weight, hair thinning\par Denies family history of thyroid cancer or history of radiation exposure to head and neck area in a childhood.\par TSH- 0.31 <-- 4.92 <-- 2.71, \par FT4 DD- 0.65\par prior T4- 4.8, T3- 113\par She recalls a normal thyroid US done several years ago\par Had DXA last week- results are still pending

## 2023-05-02 NOTE — ASSESSMENT
[Levothyroxine] : The patient was instructed to take Levothyroxine on an empty stomach, separate from vitamins, and wait at least 30 minutes before eating [FreeTextEntry1] : 1. Hypothyroidism. AITD\par - discussed with the patient current approaches to Hashimoto's thyroiditis\par - options to lower autoimmunity reviewed\par - advised on R+B of a combination T3/T4 therapy\par - diets reviewed\par - cont L-thyroxine 125 mcg qd and check labs today. Will likely switch back to Tirosint\par - Proper intake of levothyroxine is reviewed in details, including on an empty stomach, with water only, at least one hour before taking any medications, vitamins, or supplements and three-four hours before taking iron or calcium.\par \par 2. Obesity\par - Current approaches to weight management are discussed with the patient. \par Suggested extensive nutritional education program. Proper dietary restrictions and exercise routines discussed. Medical weight loss therapies were reviewed with the patient. We discussed trial of Qsymia or Wegovy (R+B), but she's doing great with life style changes. She will continue with her current regimen\par \par RTC 3 to 6 months, labs prior\par

## 2023-05-03 LAB
25(OH)D3 SERPL-MCNC: 38.2 NG/ML
ALBUMIN SERPL ELPH-MCNC: 4.4 G/DL
ALP BLD-CCNC: 92 U/L
ALT SERPL-CCNC: 24 U/L
ANION GAP SERPL CALC-SCNC: 17 MMOL/L
AST SERPL-CCNC: 25 U/L
BILIRUB SERPL-MCNC: <0.2 MG/DL
BUN SERPL-MCNC: 24 MG/DL
CALCIUM SERPL-MCNC: 9.7 MG/DL
CHLORIDE SERPL-SCNC: 103 MMOL/L
CO2 SERPL-SCNC: 23 MMOL/L
CREAT SERPL-MCNC: 0.93 MG/DL
EGFR: 72 ML/MIN/1.73M2
ESTIMATED AVERAGE GLUCOSE: 117 MG/DL
GLUCOSE SERPL-MCNC: 91 MG/DL
HBA1C MFR BLD HPLC: 5.7 %
POTASSIUM SERPL-SCNC: 4.2 MMOL/L
PROT SERPL-MCNC: 7 G/DL
SODIUM SERPL-SCNC: 142 MMOL/L
T4 FREE SERPL-MCNC: 1.3 NG/DL
TSH SERPL-ACNC: 3.36 UIU/ML

## 2023-05-05 ENCOUNTER — TRANSCRIPTION ENCOUNTER (OUTPATIENT)
Age: 58
End: 2023-05-05

## 2023-05-05 RX ORDER — LEVOTHYROXINE SODIUM 0.12 MG/1
125 TABLET ORAL DAILY
Qty: 90 | Refills: 3 | Status: DISCONTINUED | COMMUNITY
Start: 2023-04-07 | End: 2023-05-05

## 2023-05-05 RX ORDER — LEVOTHYROXINE SODIUM 125 UG/1
125 TABLET ORAL
Qty: 30 | Refills: 1 | Status: DISCONTINUED | COMMUNITY
Start: 2023-01-10 | End: 2023-05-05

## 2023-08-15 ENCOUNTER — OUTPATIENT (OUTPATIENT)
Dept: OUTPATIENT SERVICES | Facility: HOSPITAL | Age: 58
LOS: 1 days | Discharge: HOME | End: 2023-08-15

## 2023-08-15 DIAGNOSIS — C50.912 MALIGNANT NEOPLASM OF UNSPECIFIED SITE OF LEFT FEMALE BREAST: ICD-10-CM

## 2023-08-23 ENCOUNTER — APPOINTMENT (OUTPATIENT)
Dept: HEMATOLOGY ONCOLOGY | Facility: CLINIC | Age: 58
End: 2023-08-23
Payer: COMMERCIAL

## 2023-08-23 ENCOUNTER — APPOINTMENT (OUTPATIENT)
Dept: HEMATOLOGY ONCOLOGY | Facility: CLINIC | Age: 58
End: 2023-08-23

## 2023-08-23 VITALS
WEIGHT: 161.6 LBS | RESPIRATION RATE: 17 BRPM | OXYGEN SATURATION: 99 % | TEMPERATURE: 97.2 F | BODY MASS INDEX: 29.74 KG/M2 | SYSTOLIC BLOOD PRESSURE: 100 MMHG | HEART RATE: 73 BPM | HEIGHT: 61.97 IN | DIASTOLIC BLOOD PRESSURE: 68 MMHG

## 2023-08-23 DIAGNOSIS — C50.912 MALIGNANT NEOPLASM OF UNSPECIFIED SITE OF LEFT FEMALE BREAST: ICD-10-CM

## 2023-08-23 PROCEDURE — 99213 OFFICE O/P EST LOW 20 MIN: CPT

## 2023-08-23 NOTE — HISTORY OF PRESENT ILLNESS
[de-identified] : 53-year-old lady with history of stage IIIa left breast cancer, ER positive and HER-2/benny positive diagnosed in 2006.\par  \par  She initially presented at age 40 years when she felt a left breast lump and noted to have matted lymph nodes on exam. Core biopsy revealed adenocarcinoma, ER positive, [place] positive and HER-2/benny positive. She received neoadjuvant chemotherapy with AC followed by TH from December 2006 to May 2007. She underwent bilateral mastectomy implants in May 2007. JCB adjuvant left chest wall radiation from July 2007 to August 2007. She received ovarian suppression (SOFT trial) plus exemestane from July 2007. She underwent bilateral salpingo-oophorectomy in 2008 she continued exemestane from July 2007 to February 2011. Patient developed pulmonary embolism in Feb 2011.. She was treated with Lovenox for 6 months. Exemestane was stopped at that time 2 new pulmonary embolism and abnormal LFTs. She was restarted on anastrozole in April 2013 but stopped in Aug 2015 due to intolerable side effects. SHe didn't want to consider prolonged AI use.  [Treatment Protocol] : Treatment Protocol [FreeTextEntry1] : ACTH 12/2006 - 5/2007.\par  Lupron + Exemestane 7/2007- 7/2008\par  Exemestane completed 2/2011. [de-identified] : Ms. FAIZAN MORTON is here for f/u of left breast cancer completed endocrine therapy 2/2011.  No residual s/e from chemo or surgery. No lymphedema. No neuropathy, no cardiac S/E. No bone pain, ROS negative She has post mastectomy pain, s/p injection by DR Downs 2 yrs ago and felt better. Implants last replaced 2009. 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 She is exercising, walking, swimming, rowing. Lost wt intentionally LMP 2006 (BSO) Thyroid issues controlled with meds Up to date with cancer screening- GYN last week colonoscopy she had at 51

## 2023-08-23 NOTE — ASSESSMENT
[FreeTextEntry1] : stage IIIa ER positive, HER-2/benny positive left breast cancer in 2006, status post neoadjuvant ACTH followed by bilateral mastectomy with implants in May 2007, radiation therapy, BSO in 2008, exemestane from July 2007 to February 2011. Exemestane was stopped in February 2011 due to pulmonary embolism and abnormal LFTs. Anastrozole was started in April 2013, which she took inconsistently for a year and a half and eventually stopped in 2015 due to significant arthralgias.  - Breast ca: She is doing well. Clinically TARSHA. No sequela from chemotherapy or surgery. Encourage healthy diet and exercise. Routine labs today - Post mastectomy pain: s/p injections for myofascial syndrome. Pain improved. Rec to f/u with plastics  - Low Vit D: Levels improved with suppls. Continue calcium and vitamin D for bone health - Continue followup with primary care for age-appropriate malignancy screening - Educated about s/sx of recurrence  - RTC  1y

## 2023-08-23 NOTE — PHYSICAL EXAM
[Fully active, able to carry on all pre-disease performance without restriction] : Status 0 - Fully active, able to carry on all pre-disease performance without restriction [Normal] : affect appropriate [de-identified] : bilateral mastectomy with implants, no chest wall or axillary nodules

## 2023-09-22 ENCOUNTER — TRANSCRIPTION ENCOUNTER (OUTPATIENT)
Age: 58
End: 2023-09-22

## 2023-09-27 ENCOUNTER — TRANSCRIPTION ENCOUNTER (OUTPATIENT)
Age: 58
End: 2023-09-27

## 2023-09-28 ENCOUNTER — TRANSCRIPTION ENCOUNTER (OUTPATIENT)
Age: 58
End: 2023-09-28

## 2023-09-28 LAB
T4 FREE SERPL-MCNC: 1.3 NG/DL
TSH SERPL-ACNC: 1.73 UIU/ML

## 2023-09-29 ENCOUNTER — TRANSCRIPTION ENCOUNTER (OUTPATIENT)
Age: 58
End: 2023-09-29

## 2023-12-04 ENCOUNTER — APPOINTMENT (OUTPATIENT)
Dept: ENDOCRINOLOGY | Facility: CLINIC | Age: 58
End: 2023-12-04
Payer: COMMERCIAL

## 2023-12-04 VITALS
BODY MASS INDEX: 29.44 KG/M2 | RESPIRATION RATE: 16 BRPM | OXYGEN SATURATION: 99 % | WEIGHT: 160 LBS | DIASTOLIC BLOOD PRESSURE: 64 MMHG | TEMPERATURE: 98.3 F | HEART RATE: 60 BPM | HEIGHT: 61.97 IN | SYSTOLIC BLOOD PRESSURE: 116 MMHG

## 2023-12-04 PROCEDURE — 99213 OFFICE O/P EST LOW 20 MIN: CPT | Mod: 25

## 2023-12-04 RX ORDER — LEVOTHYROXINE SODIUM 125 UG/1
125 CAPSULE ORAL DAILY
Qty: 90 | Refills: 2 | Status: ACTIVE | COMMUNITY
Start: 2023-05-05 | End: 1900-01-01

## 2024-02-02 ENCOUNTER — TRANSCRIPTION ENCOUNTER (OUTPATIENT)
Age: 59
End: 2024-02-02

## 2024-04-10 ENCOUNTER — TRANSCRIPTION ENCOUNTER (OUTPATIENT)
Age: 59
End: 2024-04-10

## 2024-05-30 ENCOUNTER — APPOINTMENT (OUTPATIENT)
Dept: ENDOCRINOLOGY | Facility: CLINIC | Age: 59
End: 2024-05-30
Payer: COMMERCIAL

## 2024-05-30 VITALS
BODY MASS INDEX: 30.91 KG/M2 | WEIGHT: 168 LBS | HEART RATE: 72 BPM | SYSTOLIC BLOOD PRESSURE: 116 MMHG | HEIGHT: 61.97 IN | OXYGEN SATURATION: 99 % | RESPIRATION RATE: 16 BRPM | DIASTOLIC BLOOD PRESSURE: 70 MMHG

## 2024-05-30 DIAGNOSIS — E03.9 HYPOTHYROIDISM, UNSPECIFIED: ICD-10-CM

## 2024-05-30 DIAGNOSIS — R73.03 PREDIABETES.: ICD-10-CM

## 2024-05-30 DIAGNOSIS — E66.9 OBESITY, UNSPECIFIED: ICD-10-CM

## 2024-05-30 PROCEDURE — 36415 COLL VENOUS BLD VENIPUNCTURE: CPT

## 2024-05-30 PROCEDURE — 99214 OFFICE O/P EST MOD 30 MIN: CPT | Mod: 25

## 2024-05-30 RX ORDER — PHENTERMINE AND TOPIRAMATE 7.5; 46 MG/1; MG/1
7.5-46 CAPSULE, EXTENDED RELEASE ORAL
Qty: 30 | Refills: 4 | Status: ACTIVE | COMMUNITY
Start: 2024-05-30 | End: 1900-01-01

## 2024-05-30 RX ORDER — PHENTERMINE AND TOPIRAMATE 3.75; 23 MG/1; MG/1
3.75-23 CAPSULE, EXTENDED RELEASE ORAL
Qty: 14 | Refills: 0 | Status: ACTIVE | COMMUNITY
Start: 2024-05-30 | End: 1900-01-01

## 2024-05-30 NOTE — HISTORY OF PRESENT ILLNESS
[FreeTextEntry1] : 58 year female  f/u  for hypothyroidism management.   *** May 30, 2024 ***  doing well, but is concerned with the weight gain, despite staying on a strict diet and exercising daily remains on Tirosint 125 mcg no h/o pancreatitis, kidney stones, HTN; not taking antidepressants no recent labs  *** Dec 04, 2023 ***  feels well, denies any c/o weight has been stable. exercising staying on Tirosint 125 mcg  labs from 9/29/23 - TSH- 1.7 per patient, last a1c- 5.7   *** May 02, 2023 ***  feels well. staying on Optavia, exercising 3-5 times a week. lost 20 lbs  staying on L-thyroxine 125 mcg. thinks that felt a bit better when taking brand Tirosint no recent labs  *** Apr 20, 2022 ***  feels great, exercising daily, doing cross fit, but still unable to lose weight had covid in january, recovered well taking Tirosint 137 mcg no recent thyroid tests  *** Mar 10, 2021 ***  returned earlier. developed migraines x past 2 months, seeing ent/ophthalmologist. treated for sinus headaches. concerned if her thyroid is responsible. did not see neuro, no brain MRI yet. no recent covid saw allergist- started on montelukast, Claritin  *** Oct 06, 2020 ***  on tirosint 137 mcg. feels well. concerned with having multiple tick bites about a month ago. No rash or any other symptoms no recent labs  *** Dec 16, 2019 ***  feels better on a lower dose  tirosint 137 mcg. no more jittery feeling no recent labs prior TSH- 15.7 (off meds x 3 weeks)  *** Oct 15, 2019 ***  feels well on Tirosint overall, but with occas palpitations past month still on 175 mcg qd Hair loss improved a lot. TSH- 0.01, FT4- 1.9 neg celiac panel  HPI: Ms. MORTON  was diagnosed with Hashimoto's hypothyroidism in 2015. Previously under care of Dr. Menjivar. Initially on synthroid 112mcg, eventually switched to [place] thyroid because she was not feeling well. She's presently on [place] 120 mg, but states that she feels the same. Still complains of fatigue, difficulties losing weight, hair thinning Denies family history of thyroid cancer or history of radiation exposure to head and neck area in a childhood. TSH- 0.31 <-- 4.92 <-- 2.71,  FT4 DD- 0.65 prior T4- 4.8, T3- 113 She recalls a normal thyroid US done several years ago Had DXA last week- results are still pending

## 2024-05-30 NOTE — ASSESSMENT
[FreeTextEntry1] : 1. Hypothyroidism. AITD - discussed with the patient current approaches to Hashimoto's thyroiditis - options to lower autoimmunity reviewed - advised on R+B of a combination T3/T4 therapy - diets reviewed - cont Tirosint 125 mcg qd  - Proper intake of levothyroxine is reviewed in details, including on an empty stomach, with water only, at least one hour before taking any medications, vitamins, or supplements and three-four hours before taking iron or calcium.  2. Obesity - Current approaches to weight management are discussed with the patient. Suggested extensive nutritional education program. Proper dietary restrictions and exercise routines discussed. Medical weight loss therapies were reviewed with the patient. We again discussed trial of Qsymia or Wegovy (R+B), Patient wants to try Qsymia. advised against abrupt stopping  RTC 3 months, labs prior [Levothyroxine] : The patient was instructed to take Levothyroxine on an empty stomach, separate from vitamins, and wait at least 30 minutes before eating

## 2024-05-31 ENCOUNTER — TRANSCRIPTION ENCOUNTER (OUTPATIENT)
Age: 59
End: 2024-05-31

## 2024-05-31 LAB
25(OH)D3 SERPL-MCNC: 32.9 NG/ML
ALBUMIN SERPL ELPH-MCNC: 4.9 G/DL
ALP BLD-CCNC: 74 U/L
ALT SERPL-CCNC: 25 U/L
ANION GAP SERPL CALC-SCNC: 13 MMOL/L
AST SERPL-CCNC: 25 U/L
BILIRUB SERPL-MCNC: 0.2 MG/DL
BUN SERPL-MCNC: 21 MG/DL
CALCIUM SERPL-MCNC: 10.1 MG/DL
CHLORIDE SERPL-SCNC: 102 MMOL/L
CHOLEST SERPL-MCNC: 224 MG/DL
CO2 SERPL-SCNC: 24 MMOL/L
CREAT SERPL-MCNC: 0.86 MG/DL
EGFR: 79 ML/MIN/1.73M2
ESTIMATED AVERAGE GLUCOSE: 117 MG/DL
GLUCOSE SERPL-MCNC: 94 MG/DL
HBA1C MFR BLD HPLC: 5.7 %
HDLC SERPL-MCNC: 72 MG/DL
LDLC SERPL CALC-MCNC: 137 MG/DL
NONHDLC SERPL-MCNC: 152 MG/DL
POTASSIUM SERPL-SCNC: 4.3 MMOL/L
PROT SERPL-MCNC: 7.5 G/DL
SODIUM SERPL-SCNC: 139 MMOL/L
T3 SERPL-MCNC: 78 NG/DL
T4 FREE SERPL-MCNC: 1.2 NG/DL
TRIGL SERPL-MCNC: 85 MG/DL
TSH SERPL-ACNC: 2.25 UIU/ML

## 2024-06-03 ENCOUNTER — NON-APPOINTMENT (OUTPATIENT)
Age: 59
End: 2024-06-03

## 2024-07-19 ENCOUNTER — APPOINTMENT (OUTPATIENT)
Dept: INTERNAL MEDICINE | Facility: CLINIC | Age: 59
End: 2024-07-19
Payer: COMMERCIAL

## 2024-07-19 ENCOUNTER — NON-APPOINTMENT (OUTPATIENT)
Age: 59
End: 2024-07-19

## 2024-07-19 VITALS
OXYGEN SATURATION: 98 % | BODY MASS INDEX: 31.1 KG/M2 | HEIGHT: 61.97 IN | SYSTOLIC BLOOD PRESSURE: 122 MMHG | WEIGHT: 169 LBS | HEART RATE: 92 BPM | DIASTOLIC BLOOD PRESSURE: 78 MMHG

## 2024-07-19 DIAGNOSIS — Z00.00 ENCOUNTER FOR GENERAL ADULT MEDICAL EXAMINATION W/OUT ABNORMAL FINDINGS: ICD-10-CM

## 2024-07-19 DIAGNOSIS — M85.80 OTHER SPECIFIED DISORDERS OF BONE DENSITY AND STRUCTURE, UNSPECIFIED SITE: ICD-10-CM

## 2024-07-19 PROCEDURE — 99396 PREV VISIT EST AGE 40-64: CPT

## 2024-07-19 RX ORDER — CALCIUM CARBONATE/VITAMIN D3 600MG-5MCG
600-5 TABLET ORAL DAILY
Qty: 90 | Refills: 0 | Status: ACTIVE | COMMUNITY
Start: 2024-07-19 | End: 1900-01-01

## 2024-07-22 ENCOUNTER — TRANSCRIPTION ENCOUNTER (OUTPATIENT)
Age: 59
End: 2024-07-22

## 2024-07-22 ENCOUNTER — APPOINTMENT (OUTPATIENT)
Dept: CARDIOLOGY | Facility: CLINIC | Age: 59
End: 2024-07-22
Payer: COMMERCIAL

## 2024-07-22 VITALS
DIASTOLIC BLOOD PRESSURE: 73 MMHG | BODY MASS INDEX: 31.72 KG/M2 | HEIGHT: 61 IN | WEIGHT: 168 LBS | SYSTOLIC BLOOD PRESSURE: 116 MMHG | HEART RATE: 84 BPM | OXYGEN SATURATION: 98 %

## 2024-07-22 DIAGNOSIS — R06.02 SHORTNESS OF BREATH: ICD-10-CM

## 2024-07-22 DIAGNOSIS — R42 DIZZINESS AND GIDDINESS: ICD-10-CM

## 2024-07-22 LAB
BASOPHILS # BLD AUTO: 0.07 K/UL
BASOPHILS NFR BLD AUTO: 1.3 %
EOSINOPHIL # BLD AUTO: 0.15 K/UL
EOSINOPHIL NFR BLD AUTO: 2.8 %
HCT VFR BLD CALC: 40.4 %
HGB BLD-MCNC: 13.4 G/DL
IMM GRANULOCYTES NFR BLD AUTO: 0.2 %
LYMPHOCYTES # BLD AUTO: 1.38 K/UL
LYMPHOCYTES NFR BLD AUTO: 25.5 %
MAN DIFF?: NORMAL
MCHC RBC-ENTMCNC: 29.9 PG
MCHC RBC-ENTMCNC: 33.2 GM/DL
MCV RBC AUTO: 90.2 FL
MONOCYTES # BLD AUTO: 0.39 K/UL
MONOCYTES NFR BLD AUTO: 7.2 %
NEUTROPHILS # BLD AUTO: 3.41 K/UL
NEUTROPHILS NFR BLD AUTO: 63 %
PLATELET # BLD AUTO: 246 K/UL
RBC # BLD: 4.48 M/UL
RBC # FLD: 12.5 %
WBC # FLD AUTO: 5.41 K/UL

## 2024-07-22 PROCEDURE — 99203 OFFICE O/P NEW LOW 30 MIN: CPT

## 2024-07-22 NOTE — HISTORY OF PRESENT ILLNESS
[FreeTextEntry1] : 58F HLD who presents for evaluation of JEREZ  Pt has noticed increased dyspnea while exercising over the last 2-3 months Previously felt well without dyspnea Does TRX training 3-5x a week 10 lb weight gain over the past few months Denies CP. Some lightheadedness with positional changes Of note, CAC score in 9/2023 was 0  Breast Ca 2006 s/p chemo, RT, bilateral mastectomy. Developed PE on aromatase inhibitor  Former smoker, quit early 40s. Father had AVR, stent late 70s, 80s. Works in a school [employer].   ECG: SR, no ST-T wave changes CAC 9/23: 0

## 2024-07-22 NOTE — DISCUSSION/SUMMARY
[FreeTextEntry1] : Pt has noted increasing JEREZ as of late, some lightheadedness CAC score in past of 0 Borderline lipids  Will arrange for transthoracic echo to ensure normal left ventricular size and function and normal valvular function. Will arrange for treadmill stress test to rule out ischemia and assess for arrhythmias  Lipids borderline, OK to hold on statin given CAC of 0 in past  RV for testing

## 2024-07-23 ENCOUNTER — TRANSCRIPTION ENCOUNTER (OUTPATIENT)
Age: 59
End: 2024-07-23

## 2024-08-06 ENCOUNTER — OUTPATIENT (OUTPATIENT)
Dept: OUTPATIENT SERVICES | Facility: HOSPITAL | Age: 59
LOS: 1 days | Discharge: HOME | End: 2024-08-06

## 2024-08-06 DIAGNOSIS — C50.912 MALIGNANT NEOPLASM OF UNSPECIFIED SITE OF LEFT FEMALE BREAST: ICD-10-CM

## 2024-08-12 ENCOUNTER — APPOINTMENT (OUTPATIENT)
Dept: HEMATOLOGY ONCOLOGY | Facility: CLINIC | Age: 59
End: 2024-08-12
Payer: COMMERCIAL

## 2024-08-12 VITALS
OXYGEN SATURATION: 99 % | RESPIRATION RATE: 16 BRPM | TEMPERATURE: 97.5 F | HEART RATE: 78 BPM | DIASTOLIC BLOOD PRESSURE: 84 MMHG | SYSTOLIC BLOOD PRESSURE: 120 MMHG | BODY MASS INDEX: 32.07 KG/M2 | WEIGHT: 169.73 LBS

## 2024-08-12 DIAGNOSIS — C50.919 MALIGNANT NEOPLASM OF UNSPECIFIED SITE OF UNSPECIFIED FEMALE BREAST: ICD-10-CM

## 2024-08-12 PROCEDURE — 99213 OFFICE O/P EST LOW 20 MIN: CPT

## 2024-08-12 PROCEDURE — G2211 COMPLEX E/M VISIT ADD ON: CPT

## 2024-08-12 NOTE — BEGINNING OF VISIT
[0] : 2) Feeling down, depressed, or hopeless: Not at all (0) [Pain Scale: ___] : On a scale of 1-10, today the patient's pain is a(n) [unfilled]. [Never] : Never [Date Discussed (MM/DD/YY): ___] : Discussed: [unfilled] [With Patient/Caregiver] : with Patient/Caregiver

## 2024-08-12 NOTE — HISTORY OF PRESENT ILLNESS
[Treatment Protocol] : Treatment Protocol [de-identified] : 53-year-old lady with history of stage IIIa left breast cancer, ER positive and HER-2/benny positive diagnosed in 2006.  She initially presented at age 40 years when she felt a left breast lump and noted to have matted lymph nodes on exam. Core biopsy revealed adenocarcinoma, ER positive, [place] positive and HER-2/benny positive. She received neoadjuvant chemotherapy with AC followed by TH from December 2006 to May 2007. She underwent bilateral mastectomy implants in May 2007. JCB adjuvant left chest wall radiation from July 2007 to August 2007. She received ovarian suppression (SOFT trial) plus exemestane from July 2007. She underwent bilateral salpingo-oophorectomy in 2008 she continued exemestane from July 2007 to February 2011. Patient developed pulmonary embolism in Feb 2011.. She was treated with Lovenox for 6 months. Exemestane was stopped at that time 2 new pulmonary embolism and abnormal LFTs. She was restarted on anastrozole in April 2013 but stopped in Aug 2015 due to intolerable side effects. SHe didn't want to consider prolonged AI use.   8/23/23: Ms. FAIZAN MORTON is here for f/u of left breast cancer completed endocrine therapy 2/2011.  No residual s/e from chemo or surgery. No lymphedema. No neuropathy, no cardiac S/E. No bone pain, ROS negative She has post mastectomy pain, s/p injection by DR Downs 2 yrs ago and felt better. Implants last replaced 2009. 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 She is exercising, walking, swimming, rowing. Lost wt intentionally LMP 2006 (BSO) Thyroid issues controlled with meds Up to date with cancer screening- GYN last week colonoscopy she had at 51 [FreeTextEntry1] : ACTH 12/2006 - 5/2007. Lupron + Exemestane 7/2007- 7/2008 Exemestane completed 2/2011. [de-identified] : Ms. FAIZAN ENGLISH is here for f/u of left breast cancer completed endocrine therapy 2/2011.  8/12/2024:  Ms. English is here for f/u of left breast cancer completed endocrine therapy 2/2011.  Breast Health: s/p bilateral mastectomies with implant reconstruction by Dr. Balbuena. GYN Health: BSO in 2006. She had tawny, left sided RT and went into early menopause, rec cardiac f/u. She had calcium score 0, going for stress test and echo  UTD with Colonoscopy: completed testing at age 51 with the recommendation to repeat testing in 10 years. regular exercise and healthy diet d/w her transition to survivorship d/w her

## 2024-08-12 NOTE — PHYSICAL EXAM
[Fully active, able to carry on all pre-disease performance without restriction] : Status 0 - Fully active, able to carry on all pre-disease performance without restriction [Normal] : affect appropriate [de-identified] : bilateral mastectomy with implants, no chest wall or axillary nodules

## 2024-08-12 NOTE — PHYSICAL EXAM
[Fully active, able to carry on all pre-disease performance without restriction] : Status 0 - Fully active, able to carry on all pre-disease performance without restriction [Normal] : affect appropriate [de-identified] : bilateral mastectomy with implants, no chest wall or axillary nodules

## 2024-08-12 NOTE — HISTORY OF PRESENT ILLNESS
[Treatment Protocol] : Treatment Protocol [de-identified] : 53-year-old lady with history of stage IIIa left breast cancer, ER positive and HER-2/benny positive diagnosed in 2006.  She initially presented at age 40 years when she felt a left breast lump and noted to have matted lymph nodes on exam. Core biopsy revealed adenocarcinoma, ER positive, [place] positive and HER-2/benny positive. She received neoadjuvant chemotherapy with AC followed by TH from December 2006 to May 2007. She underwent bilateral mastectomy implants in May 2007. JCB adjuvant left chest wall radiation from July 2007 to August 2007. She received ovarian suppression (SOFT trial) plus exemestane from July 2007. She underwent bilateral salpingo-oophorectomy in 2008 she continued exemestane from July 2007 to February 2011. Patient developed pulmonary embolism in Feb 2011.. She was treated with Lovenox for 6 months. Exemestane was stopped at that time 2 new pulmonary embolism and abnormal LFTs. She was restarted on anastrozole in April 2013 but stopped in Aug 2015 due to intolerable side effects. SHe didn't want to consider prolonged AI use.   8/23/23: Ms. FAIZAN MORTON is here for f/u of left breast cancer completed endocrine therapy 2/2011.  No residual s/e from chemo or surgery. No lymphedema. No neuropathy, no cardiac S/E. No bone pain, ROS negative She has post mastectomy pain, s/p injection by DR Downs 2 yrs ago and felt better. Implants last replaced 2009. 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 She is exercising, walking, swimming, rowing. Lost wt intentionally LMP 2006 (BSO) Thyroid issues controlled with meds Up to date with cancer screening- GYN last week colonoscopy she had at 51 [FreeTextEntry1] : ACTH 12/2006 - 5/2007. Lupron + Exemestane 7/2007- 7/2008 Exemestane completed 2/2011. [de-identified] : Ms. FAIZAN ENGLISH is here for f/u of left breast cancer completed endocrine therapy 2/2011.  8/12/2024:  Ms. English is here for f/u of left breast cancer completed endocrine therapy 2/2011.  Breast Health: s/p bilateral mastectomies with implant reconstruction by Dr. Balbuena. GYN Health: BSO in 2006. She had tawny, left sided RT and went into early menopause, rec cardiac f/u. She had calcium score 0, going for stress test and echo  UTD with Colonoscopy: completed testing at age 51 with the recommendation to repeat testing in 10 years. regular exercise and healthy diet d/w her transition to survivorship d/w her

## 2024-08-12 NOTE — ASSESSMENT
[FreeTextEntry1] : stage IIIa ER positive, HER-2/benny positive left breast cancer in 2006, status post neoadjuvant ACTH followed by bilateral mastectomy with implants in May 2007, radiation therapy, BSO in 2008, exemestane from July 2007 to February 2011. Exemestane was stopped in February 2011 due to pulmonary embolism and abnormal LFTs. Anastrozole was started in April 2013, which she took inconsistently for a year and a half and eventually stopped in 2015 due to significant arthralgias.  - Breast ca: She is doing well. Clinically TARSHA. No sequela from chemotherapy or surgery. Encourage healthy diet and exercise. Routine labs today - Post mastectomy pain: s/p injections for myofascial syndrome. Pain improved. Rec to f/u with plastics  - Low Vit D: Levels improved with suppls. Continue calcium and vitamin D for bone health - Continue followup with primary care for age-appropriate malignancy screening - Educated about s/sx of recurrence Breast Health: s/p bilateral mastectomies with implant reconstruction by Dr. Balbuena. GYN Health: BSO in 2006. She had tawny, left sided RT and went into early menopause, rec cardiac f/u. She had calcium score 0, going for stress test and echo  UTD with Colonoscopy: completed testing at age 51 with the recommendation to repeat testing in 10 years. regular exercise and healthy diet d/w her transition to survivorship d/w her

## 2024-08-15 ENCOUNTER — APPOINTMENT (OUTPATIENT)
Dept: ENDOCRINOLOGY | Facility: CLINIC | Age: 59
End: 2024-08-15
Payer: COMMERCIAL

## 2024-08-15 VITALS
WEIGHT: 170 LBS | SYSTOLIC BLOOD PRESSURE: 112 MMHG | TEMPERATURE: 98.6 F | RESPIRATION RATE: 16 BRPM | OXYGEN SATURATION: 96 % | HEIGHT: 61 IN | BODY MASS INDEX: 32.1 KG/M2 | DIASTOLIC BLOOD PRESSURE: 74 MMHG | HEART RATE: 77 BPM

## 2024-08-15 DIAGNOSIS — R73.03 PREDIABETES.: ICD-10-CM

## 2024-08-15 DIAGNOSIS — E03.9 HYPOTHYROIDISM, UNSPECIFIED: ICD-10-CM

## 2024-08-15 DIAGNOSIS — E66.9 OBESITY, UNSPECIFIED: ICD-10-CM

## 2024-08-15 PROCEDURE — 99213 OFFICE O/P EST LOW 20 MIN: CPT

## 2024-08-15 NOTE — ASSESSMENT
[FreeTextEntry1] : 1. Hypothyroidism. AITD - discussed with the patient current approaches to Hashimoto's thyroiditis - options to lower autoimmunity reviewed - advised on R+B of a combination T3/T4 therapy - diets reviewed - cont Tirosint 125 mcg qd  - Proper intake of levothyroxine is reviewed in details, including on an empty stomach, with water only, at least one hour before taking any medications, vitamins, or supplements and three-four hours before taking iron or calcium.  2. Obesity - Current approaches to weight management are discussed with the patient. Suggested extensive nutritional education program. Proper dietary restrictions and exercise routines discussed. Medical weight loss therapies were reviewed with the patient. We again discussed trial of Qsymia or Wegovy (R+B), Patient wants to wait and focus on her diet and LSM changes  RTC 3 months, labs prior [Levothyroxine] : The patient was instructed to take Levothyroxine on an empty stomach, separate from vitamins, and wait at least 30 minutes before eating

## 2024-08-15 NOTE — HISTORY OF PRESENT ILLNESS
[FreeTextEntry1] : 58 year female  f/u  for hypothyroidism management.   *** Aug 15, 2024 ***  feels fine. never started Qsymia d/t fear of SE rejoined [facility], but did not lose any weight, and actually gained some staying on Tirosint 125 mcg labs from May reviewed  *** May 30, 2024 ***  doing well, but is concerned with the weight gain, despite staying on a strict diet and exercising daily remains on Tirosint 125 mcg no h/o pancreatitis, kidney stones, HTN; not taking antidepressants no recent labs  *** Dec 04, 2023 ***  feels well, denies any c/o weight has been stable. exercising staying on Tirosint 125 mcg  labs from 9/29/23 - TSH- 1.7 per patient, last a1c- 5.7   *** May 02, 2023 ***  feels well. staying on Optavia, exercising 3-5 times a week. lost 20 lbs  staying on L-thyroxine 125 mcg. thinks that felt a bit better when taking brand Tirosint no recent labs  *** Apr 20, 2022 ***  feels great, exercising daily, doing cross fit, but still unable to lose weight had covid in january, recovered well taking Tirosint 137 mcg no recent thyroid tests  *** Mar 10, 2021 ***  returned earlier. developed migraines x past 2 months, seeing ent/ophthalmologist. treated for sinus headaches. concerned if her thyroid is responsible. did not see neuro, no brain MRI yet. no recent covid saw allergist- started on montelukast, Claritin  *** Oct 06, 2020 ***  on tirosint 137 mcg. feels well. concerned with having multiple tick bites about a month ago. No rash or any other symptoms no recent labs  *** Dec 16, 2019 ***  feels better on a lower dose  tirosint 137 mcg. no more jittery feeling no recent labs prior TSH- 15.7 (off meds x 3 weeks)  *** Oct 15, 2019 ***  feels well on Tirosint overall, but with occas palpitations past month still on 175 mcg qd Hair loss improved a lot. TSH- 0.01, FT4- 1.9 neg celiac panel  HPI: Ms. MORTON  was diagnosed with Hashimoto's hypothyroidism in 2015. Previously under care of Dr. Menjivar. Initially on synthroid 112mcg, eventually switched to [place] thyroid because she was not feeling well. She's presently on [place] 120 mg, but states that she feels the same. Still complains of fatigue, difficulties losing weight, hair thinning Denies family history of thyroid cancer or history of radiation exposure to head and neck area in a childhood. TSH- 0.31 <-- 4.92 <-- 2.71,  FT4 DD- 0.65 prior T4- 4.8, T3- 113 She recalls a normal thyroid US done several years ago Had DXA last week- results are still pending

## 2024-08-27 ENCOUNTER — APPOINTMENT (OUTPATIENT)
Dept: CARDIOLOGY | Facility: CLINIC | Age: 59
End: 2024-08-27
Payer: COMMERCIAL

## 2024-08-27 ENCOUNTER — APPOINTMENT (OUTPATIENT)
Dept: INTERNAL MEDICINE | Facility: CLINIC | Age: 59
End: 2024-08-27
Payer: COMMERCIAL

## 2024-08-27 PROCEDURE — 93306 TTE W/DOPPLER COMPLETE: CPT

## 2024-08-27 PROCEDURE — 93015 CV STRESS TEST SUPVJ I&R: CPT

## 2024-12-03 ENCOUNTER — APPOINTMENT (OUTPATIENT)
Dept: ENDOCRINOLOGY | Facility: CLINIC | Age: 59
End: 2024-12-03
Payer: COMMERCIAL

## 2024-12-03 VITALS
OXYGEN SATURATION: 98 % | BODY MASS INDEX: 31.91 KG/M2 | DIASTOLIC BLOOD PRESSURE: 74 MMHG | RESPIRATION RATE: 16 BRPM | HEIGHT: 61 IN | HEART RATE: 78 BPM | SYSTOLIC BLOOD PRESSURE: 115 MMHG | WEIGHT: 169 LBS | TEMPERATURE: 98.1 F

## 2024-12-03 DIAGNOSIS — E66.9 OBESITY, UNSPECIFIED: ICD-10-CM

## 2024-12-03 DIAGNOSIS — E03.9 HYPOTHYROIDISM, UNSPECIFIED: ICD-10-CM

## 2024-12-03 PROCEDURE — 36415 COLL VENOUS BLD VENIPUNCTURE: CPT

## 2024-12-03 PROCEDURE — 99214 OFFICE O/P EST MOD 30 MIN: CPT

## 2024-12-04 ENCOUNTER — TRANSCRIPTION ENCOUNTER (OUTPATIENT)
Age: 59
End: 2024-12-04

## 2024-12-04 LAB
25(OH)D3 SERPL-MCNC: 33.4 NG/ML
ALBUMIN SERPL ELPH-MCNC: 4.8 G/DL
ALP BLD-CCNC: 70 U/L
ALT SERPL-CCNC: 37 U/L
ANION GAP SERPL CALC-SCNC: 14 MMOL/L
AST SERPL-CCNC: 32 U/L
BILIRUB SERPL-MCNC: 0.3 MG/DL
BUN SERPL-MCNC: 20 MG/DL
CALCIUM SERPL-MCNC: 9.9 MG/DL
CHLORIDE SERPL-SCNC: 99 MMOL/L
CO2 SERPL-SCNC: 24 MMOL/L
CREAT SERPL-MCNC: 0.8 MG/DL
EGFR: 85 ML/MIN/1.73M2
ESTIMATED AVERAGE GLUCOSE: 117 MG/DL
GLUCOSE SERPL-MCNC: 70 MG/DL
HBA1C MFR BLD HPLC: 5.7 %
POTASSIUM SERPL-SCNC: 5 MMOL/L
PROT SERPL-MCNC: 7.4 G/DL
SODIUM SERPL-SCNC: 138 MMOL/L
T4 FREE SERPL-MCNC: 1.5 NG/DL
TSH SERPL-ACNC: 0.32 UIU/ML

## 2024-12-10 ENCOUNTER — TRANSCRIPTION ENCOUNTER (OUTPATIENT)
Age: 59
End: 2024-12-10

## 2025-01-02 ENCOUNTER — TRANSCRIPTION ENCOUNTER (OUTPATIENT)
Age: 60
End: 2025-01-02

## 2025-01-06 ENCOUNTER — APPOINTMENT (OUTPATIENT)
Dept: INTERNAL MEDICINE | Facility: CLINIC | Age: 60
End: 2025-01-06

## 2025-04-28 ENCOUNTER — NON-APPOINTMENT (OUTPATIENT)
Age: 60
End: 2025-04-28

## 2025-04-30 ENCOUNTER — APPOINTMENT (OUTPATIENT)
Dept: INTERNAL MEDICINE | Facility: CLINIC | Age: 60
End: 2025-04-30

## 2025-04-30 ENCOUNTER — APPOINTMENT (OUTPATIENT)
Dept: ENDOCRINOLOGY | Facility: CLINIC | Age: 60
End: 2025-04-30
Payer: COMMERCIAL

## 2025-04-30 VITALS
HEART RATE: 70 BPM | OXYGEN SATURATION: 98 % | SYSTOLIC BLOOD PRESSURE: 110 MMHG | BODY MASS INDEX: 30.21 KG/M2 | RESPIRATION RATE: 16 BRPM | HEIGHT: 61 IN | DIASTOLIC BLOOD PRESSURE: 75 MMHG | WEIGHT: 160 LBS

## 2025-04-30 DIAGNOSIS — R73.03 PREDIABETES.: ICD-10-CM

## 2025-04-30 DIAGNOSIS — E66.9 OBESITY, UNSPECIFIED: ICD-10-CM

## 2025-04-30 DIAGNOSIS — M85.80 OTHER SPECIFIED DISORDERS OF BONE DENSITY AND STRUCTURE, UNSPECIFIED SITE: ICD-10-CM

## 2025-04-30 DIAGNOSIS — E03.9 HYPOTHYROIDISM, UNSPECIFIED: ICD-10-CM

## 2025-04-30 PROCEDURE — 99214 OFFICE O/P EST MOD 30 MIN: CPT

## 2025-04-30 PROCEDURE — 36415 COLL VENOUS BLD VENIPUNCTURE: CPT

## 2025-05-02 ENCOUNTER — TRANSCRIPTION ENCOUNTER (OUTPATIENT)
Age: 60
End: 2025-05-02

## 2025-05-02 LAB
25(OH)D3 SERPL-MCNC: 33.5 NG/ML
ALBUMIN SERPL ELPH-MCNC: 4.7 G/DL
ALP BLD-CCNC: 73 U/L
ALT SERPL-CCNC: 35 U/L
ANION GAP SERPL CALC-SCNC: 14 MMOL/L
AST SERPL-CCNC: 28 U/L
BILIRUB SERPL-MCNC: 0.2 MG/DL
BUN SERPL-MCNC: 23 MG/DL
CALCIUM SERPL-MCNC: 10.2 MG/DL
CHLORIDE SERPL-SCNC: 101 MMOL/L
CO2 SERPL-SCNC: 23 MMOL/L
CREAT SERPL-MCNC: 0.87 MG/DL
EGFRCR SERPLBLD CKD-EPI 2021: 77 ML/MIN/1.73M2
ESTIMATED AVERAGE GLUCOSE: 123 MG/DL
GLUCOSE SERPL-MCNC: 94 MG/DL
HBA1C MFR BLD HPLC: 5.9 %
POTASSIUM SERPL-SCNC: 4.9 MMOL/L
PROT SERPL-MCNC: 7.3 G/DL
SODIUM SERPL-SCNC: 138 MMOL/L
T4 FREE SERPL-MCNC: 1.4 NG/DL
TSH SERPL-ACNC: 0.28 UIU/ML

## 2025-07-08 NOTE — HISTORY OF PRESENT ILLNESS
See Refill encounter 6/30/2025 for refill documentation. RX sent to mail order per patient previous request.    [FreeTextEntry1] : 55 year female  f/u  for hypothyroidism management. \par \par *** Mar 10, 2021 ***\par \par returned earlier. developed migraines x past 2 months, seeing ent/ophthalmologist. treated for sinus headaches. concerned if her thyroid is responsible. did not see neuro, no brain MRI yet. no recent covid\par saw allergist- started on montelukast, Claritin\par \par *** Oct 06, 2020 ***\par \par on tirosint 137 mcg. feels well. concerned with having multiple tick bites about a month ago. No rash or any other symptoms\par no recent labs\par \par *** Dec 16, 2019 ***\par \par feels better on a lower dose  tirosint 137 mcg. no more jittery feeling\par no recent labs\par prior TSH- 15.7 (off meds x 3 weeks)\par \par *** Oct 15, 2019 ***\par \par feels well on Tirosint overall, but with occas palpitations past month\par still on 175 mcg qd\par Hair loss improved a lot.\par TSH- 0.01, FT4- 1.9\par neg celiac panel\par \par HPI:\par Ms. MORTON  was diagnosed with Hashimoto's hypothyroidism in 2015. Previously under care of Dr. Menjivar.\par Initially on synthroid 112mcg, eventually switched to [place] thyroid because she was not feeling well. She's presently on [place] 120 mg, but states that she feels the same. Still complains of fatigue, difficulties losing weight, hair thinning\par Denies family history of thyroid cancer or history of radiation exposure to head and neck area in a childhood.\par TSH- 0.31 <-- 4.92 <-- 2.71, \par FT4 DD- 0.65\par prior T4- 4.8, T3- 113\par She recalls a normal thyroid US done several years ago\par Had DXA last week- results are still pending